# Patient Record
Sex: FEMALE | Race: WHITE | NOT HISPANIC OR LATINO | Employment: OTHER | URBAN - METROPOLITAN AREA
[De-identification: names, ages, dates, MRNs, and addresses within clinical notes are randomized per-mention and may not be internally consistent; named-entity substitution may affect disease eponyms.]

---

## 2024-01-12 PROBLEM — M79.641 RIGHT HAND PAIN: Status: ACTIVE | Noted: 2024-01-12

## 2024-01-15 ENCOUNTER — TELEPHONE (OUTPATIENT)
Dept: ENDOCRINOLOGY | Age: 69
End: 2024-01-15
Payer: MEDICARE

## 2024-01-15 ENCOUNTER — TELEPHONE (OUTPATIENT)
Dept: FAMILY MEDICINE CLINIC | Facility: CLINIC | Age: 69
End: 2024-01-15

## 2024-01-15 DIAGNOSIS — E87.5 HYPERKALEMIA: Primary | ICD-10-CM

## 2024-01-15 NOTE — TELEPHONE ENCOUNTER
Contacted [employer] rep to see if they could send us an order form for pt to get their supplies with them.

## 2024-01-15 NOTE — TELEPHONE ENCOUNTER
Provider: MEREDITH DAVID     Caller: NIKKIE GARCIA     Relationship to Patient: SELF    Phone Number:     [phone]       Reason for Call: THE PT WANTS TO CHANGE WHERE SHE GETS HER DEXCOM SUPPLIES TO [facility] THEIR PHONE NUMBER [phone].

## 2024-01-17 ENCOUNTER — TELEPHONE (OUTPATIENT)
Dept: ENDOCRINOLOGY | Age: 69
End: 2024-01-17
Payer: MEDICARE

## 2024-01-17 NOTE — TELEPHONE ENCOUNTER
Caller: Yulia Freedman    Relationship: Self    Best call back number: [phone]    Equipment requested: DIABETIC SUPPLIES     Reason for the request: PT NAME HAS CHANGED TO ODILONKO AND THAT IS WHAT IS ON INS CARD, DUE TO NAME MISMATCH ON RX AND INS CARD, [facility] WAS UNSURE WHAT TO DO WITH RX AND WILL NEED A NEW ONE WRITTEN.     Prescribing Provider: JOANN     Additional information or concerns: PT IS GETTING LOW, BUT IS OKAY AT THE MOMENT.

## 2024-01-24 ENCOUNTER — TELEPHONE (OUTPATIENT)
Dept: ENDOCRINOLOGY | Age: 69
End: 2024-01-24

## 2024-01-24 ENCOUNTER — TELEPHONE (OUTPATIENT)
Dept: ENDOCRINOLOGY | Age: 69
End: 2024-01-24
Payer: MEDICARE

## 2024-01-24 ENCOUNTER — TELEPHONE (OUTPATIENT)
Dept: FAMILY MEDICINE CLINIC | Facility: CLINIC | Age: 69
End: 2024-01-24
Payer: MEDICARE

## 2024-01-24 NOTE — TELEPHONE ENCOUNTER
Caller: Yulia Iniguez    Relationship: Self    Best call back number: [phone]     Requested Prescriptions:      NEW RX FOR DEXCOM G6 AND SUPPLIES    Pharmacy where request should be sent:  [facility]    Last office visit with prescribing clinician: 11/13/2023     Next office visit with prescribing clinician: 5/24/2024     Additional details provided by patient: PATIENT STATES [facility] CALLED HER STATING THEY HAD FAXED A REQUEST TO OUR OFFICE, AND WERE STILL WAITING TO RECEIVE ORDERS.      Does the patient have less than a 3 day supply:  [x] Yes  [] No    Would you like a call back once the refill request has been completed: [x] Yes [] No    If the office needs to give you a call back, can they leave a voicemail: [x] Yes [] No    Carmina Li Rep   01/24/24 15:09 EST

## 2024-01-24 NOTE — TELEPHONE ENCOUNTER
Patient called to ask about progress on her referral to hand surgery. Patient would like a call back at [phone].

## 2024-01-24 NOTE — TELEPHONE ENCOUNTER
Caller: NNEKA([facility])    Relationship: Other    Best call back number: [phone]    What form or medical record are you requesting: FORM REGARDING HER DEXCOM G6    Who is requesting this form or medical record from you: [facility]    How would you like to receive the form or medical records (pick-up, mail, fax):   If fax, what is the fax number: [phone]  Timeframe paperwork needed: AS SOON AS POSSIBLE    Additional notes: THEY NEED TO VERIFY THAT HER NAME IS LISTED AS KRISTY AND NOT JOSE

## 2024-01-26 DIAGNOSIS — E87.5 HYPERKALEMIA: ICD-10-CM

## 2024-01-26 NOTE — TELEPHONE ENCOUNTER
Caller: Yulia Iniguez    Relationship to patient: Self    Best call back number: [phone]    Patient is needing: PATIENT IS NEEDING G6 SUPPLIES SENT, AND THE G7 WAS CALLED IN AND NOT THE G6. PLEASE ADVICE, PATIENT IS VERY UPSET

## 2024-01-27 LAB
BUN SERPL-MCNC: 16 MG/DL (ref 8–23)
BUN/CREAT SERPL: 14.2 (ref 7–25)
CALCIUM SERPL-MCNC: 9.8 MG/DL (ref 8.6–10.5)
CHLORIDE SERPL-SCNC: 104 MMOL/L (ref 98–107)
CO2 SERPL-SCNC: 25.7 MMOL/L (ref 22–29)
CREAT SERPL-MCNC: 1.13 MG/DL (ref 0.57–1)
EGFRCR SERPLBLD CKD-EPI 2021: 53.1 ML/MIN/1.73
GLUCOSE SERPL-MCNC: 134 MG/DL (ref 65–99)
POTASSIUM SERPL-SCNC: 5.3 MMOL/L (ref 3.5–5.2)
SODIUM SERPL-SCNC: 140 MMOL/L (ref 136–145)

## 2024-01-29 DIAGNOSIS — I10 PRIMARY HYPERTENSION: Primary | ICD-10-CM

## 2024-01-31 ENCOUNTER — TELEPHONE (OUTPATIENT)
Dept: ENDOCRINOLOGY | Age: 69
End: 2024-01-31
Payer: MEDICARE

## 2024-01-31 NOTE — TELEPHONE ENCOUNTER
Pharmacy Name:  [facility]     Reference Number (if applicable): [id number]     Pharmacy representative name: HAILY COURTNEY     Pharmacy representative phone number: [phone]    What medication are you calling in regards to: DEXCOM G6 SENSORS     What question does the pharmacy have: NEEDS A PA THROUGH [employer] FOR [employer] D COVERAGE. PT IS DOWN TO LAST SENSOR. PLEASE CALL ASAP.     Who is the provider that prescribed the medication: MEREDITH DAVID     Additional notes: NA

## 2024-02-01 NOTE — TELEPHONE ENCOUNTER
Called and spoke with Adelaida from [facility] to let them know I already did a pa for her cgm supplies and it was approved, they confirmed that they saw that and will ship patient's order.

## 2024-02-12 ENCOUNTER — OFFICE VISIT (OUTPATIENT)
Dept: SURGERY | Facility: CLINIC | Age: 69
End: 2024-02-12
Payer: MEDICARE

## 2024-02-12 ENCOUNTER — TELEPHONE (OUTPATIENT)
Dept: ENDOCRINOLOGY | Age: 69
End: 2024-02-12
Payer: MEDICARE

## 2024-02-12 VITALS
DIASTOLIC BLOOD PRESSURE: 76 MMHG | BODY MASS INDEX: 29.02 KG/M2 | WEIGHT: 147.8 LBS | SYSTOLIC BLOOD PRESSURE: 132 MMHG | HEIGHT: 60 IN

## 2024-02-12 DIAGNOSIS — N60.92 ATYPICAL DUCTAL HYPERPLASIA OF LEFT BREAST: ICD-10-CM

## 2024-02-12 DIAGNOSIS — N64.89 RADIAL SCAR OF BREAST: ICD-10-CM

## 2024-02-12 DIAGNOSIS — Z12.31 ENCOUNTER FOR SCREENING MAMMOGRAM FOR MALIGNANT NEOPLASM OF BREAST: ICD-10-CM

## 2024-02-12 DIAGNOSIS — Z91.89 AT HIGH RISK FOR BREAST CANCER: Primary | ICD-10-CM

## 2024-02-12 PROCEDURE — 1160F RVW MEDS BY RX/DR IN RCRD: CPT

## 2024-02-12 PROCEDURE — 3078F DIAST BP <80 MM HG: CPT

## 2024-02-12 PROCEDURE — 3075F SYST BP GE 130 - 139MM HG: CPT

## 2024-02-12 PROCEDURE — 1159F MED LIST DOCD IN RCRD: CPT

## 2024-02-12 PROCEDURE — 99213 OFFICE O/P EST LOW 20 MIN: CPT

## 2024-03-15 ENCOUNTER — TELEPHONE (OUTPATIENT)
Dept: ENDOCRINOLOGY | Age: 69
End: 2024-03-15
Payer: MEDICARE

## 2024-03-15 ENCOUNTER — HOSPITAL ENCOUNTER (OUTPATIENT)
Dept: MAMMOGRAPHY | Facility: HOSPITAL | Age: 69
Discharge: HOME | End: 2024-03-15
Payer: MEDICARE

## 2024-03-15 DIAGNOSIS — Z91.89 AT HIGH RISK FOR BREAST CANCER: ICD-10-CM

## 2024-03-15 DIAGNOSIS — E10.65 TYPE 1 DIABETES MELLITUS WITH HYPERGLYCEMIA: ICD-10-CM

## 2024-03-15 DIAGNOSIS — N60.92 ATYPICAL DUCTAL HYPERPLASIA OF LEFT BREAST: ICD-10-CM

## 2024-03-15 DIAGNOSIS — N64.89 RADIAL SCAR OF BREAST: ICD-10-CM

## 2024-03-15 DIAGNOSIS — Z12.31 ENCOUNTER FOR SCREENING MAMMOGRAM FOR MALIGNANT NEOPLASM OF BREAST: ICD-10-CM

## 2024-03-15 PROCEDURE — 77067 SCR MAMMO BI INCL CAD: CPT

## 2024-03-15 PROCEDURE — 77063 BREAST TOMOSYNTHESIS BI: CPT

## 2024-03-15 RX ORDER — INSULIN GLARGINE 100 [IU]/ML
20 INJECTION, SOLUTION SUBCUTANEOUS DAILY
Qty: 18 ML | Refills: 0 | Status: SHIPPED | OUTPATIENT
Start: 2024-03-15

## 2024-03-15 NOTE — TELEPHONE ENCOUNTER
PT CAME IN FOR A REFILL OF THE BELOW MED, SHE STATED THAT IT NEEDS TO BE IN THE VIALS NOT THE PENS.      insulin glargine (Lantus) 100 UNIT/ML injection [43535]         GOING TO:  [facility]. - [address] [phone] [facility] [phone] FX

## 2024-03-19 ENCOUNTER — TELEPHONE (OUTPATIENT)
Dept: SURGERY | Facility: CLINIC | Age: 69
End: 2024-03-19
Payer: MEDICARE

## 2024-03-19 DIAGNOSIS — N60.92 ATYPICAL DUCTAL HYPERPLASIA OF LEFT BREAST: ICD-10-CM

## 2024-03-19 DIAGNOSIS — N64.89 RADIAL SCAR OF BREAST: ICD-10-CM

## 2024-03-19 DIAGNOSIS — Z91.89 AT HIGH RISK FOR BREAST CANCER: Primary | ICD-10-CM

## 2024-03-19 NOTE — TELEPHONE ENCOUNTER
----- Message from Narcisa Marsh PA-C sent at 3/19/2024  1:24 PM EDT -----  Regarding: results  Hi,    Can you please call her and let her know that her recent mammogram was benign?  Her MRI will be due 09/2024. I have placed an order. She should keep her one year follow-up appointment with me next year.  If she needs anything sooner, please let me know.    ---  3/15/2024 Bilateral Screening Mammogram:  IMPRESSION:  1. There is no evidence for malignancy or significant change in either breast. Routine followup mammography is recommended.   BI-RADS category 2: Benign.    Thanks,  Narcisa

## 2024-04-02 DIAGNOSIS — E78.2 MIXED HYPERLIPIDEMIA: ICD-10-CM

## 2024-04-02 DIAGNOSIS — I10 PRIMARY HYPERTENSION: ICD-10-CM

## 2024-04-02 DIAGNOSIS — M81.0 OSTEOPOROSIS WITHOUT CURRENT PATHOLOGICAL FRACTURE, UNSPECIFIED OSTEOPOROSIS TYPE: ICD-10-CM

## 2024-04-02 RX ORDER — ALENDRONATE SODIUM 70 MG/1
TABLET ORAL
Qty: 12 TABLET | Refills: 3 | Status: SHIPPED | OUTPATIENT
Start: 2024-04-02

## 2024-04-02 RX ORDER — ATORVASTATIN CALCIUM 10 MG/1
10 TABLET, FILM COATED ORAL DAILY
Qty: 90 TABLET | Refills: 3 | Status: SHIPPED | OUTPATIENT
Start: 2024-04-02

## 2024-04-02 RX ORDER — HYDROCHLOROTHIAZIDE 25 MG/1
25 TABLET ORAL DAILY
Qty: 90 TABLET | Refills: 3 | Status: SHIPPED | OUTPATIENT
Start: 2024-04-02

## 2024-04-02 RX ORDER — LISINOPRIL 40 MG/1
40 TABLET ORAL DAILY
Qty: 90 TABLET | Refills: 3 | Status: SHIPPED | OUTPATIENT
Start: 2024-04-02

## 2024-04-02 NOTE — TELEPHONE ENCOUNTER
Caller: Yulia Iniguez    Relationship: Self    Best call back number: [phone]    Requested Prescriptions:   Requested Prescriptions     Pending Prescriptions Disp Refills    lisinopril (PRINIVIL,ZESTRIL) 40 MG tablet 90 tablet 3     Sig: Take 1 tablet by mouth Daily.    atorvastatin (LIPITOR) 10 MG tablet 90 tablet 3     Sig: Take 1 tablet by mouth Daily.    hydroCHLOROthiazide 25 MG tablet 90 tablet 3     Sig: Take 1 tablet by mouth Daily.    alendronate (FOSAMAX) 70 MG tablet 12 tablet 3     Sig: TAKE 1 TABLET EVERY 7 DAYS WITH LARGE GLASS OF WATER 30 MIN BEFORE FIRST FOOD, DRINK, MEDS; AVOID LYING DOWN FOR 30 MIN        Pharmacy where request should be sent: [facility], [address] [phone] [facility] [phone]      Last office visit with prescribing clinician: 1/12/2024   Last telemedicine visit with prescribing clinician: Visit date not found   Next office visit with prescribing clinician: 7/26/2024     Additional details provided by patient: PATIENT STATES SHE HAS A WEEKS WORTH ON THESE MEDICATIONS.     Does the patient have less than a 3 day supply:  [] Yes  [] No    Would you like a call back once the refill request has been completed: [x] Yes [] No    If the office needs to give you a call back, can they leave a voicemail: [x] Yes [] No    Delmy Salinas, GIRMA   04/02/24 08:57 EDT

## 2024-04-02 NOTE — TELEPHONE ENCOUNTER
Last office visit with prescribing clinician: 1/12/2024   Last telemedicine visit with prescribing clinician: Visit date not found   Next office visit with prescribing clinician: 7/26/2024

## 2024-04-03 ENCOUNTER — TELEPHONE (OUTPATIENT)
Dept: ENDOCRINOLOGY | Age: 69
End: 2024-04-03

## 2024-04-03 NOTE — TELEPHONE ENCOUNTER
"Can you tell me what they are referring to? Is there a different brand- if you want to contact the patient, I am happy to send her RX to you all since her current pharmacy is not helping her and I can't just call the pharmacy and say \" level 2\"- so I'm not even sure what they mean   "

## 2024-04-12 ENCOUNTER — TRANSCRIBE ORDERS (OUTPATIENT)
Dept: ADMINISTRATIVE | Facility: HOSPITAL | Age: 69
End: 2024-04-12
Payer: MEDICARE

## 2024-04-12 DIAGNOSIS — Z87.891 PERSONAL HISTORY OF TOBACCO USE, PRESENTING HAZARDS TO HEALTH: Primary | ICD-10-CM

## 2024-04-18 ENCOUNTER — TELEPHONE (OUTPATIENT)
Dept: ENDOCRINOLOGY | Age: 69
End: 2024-04-18
Payer: MEDICARE

## 2024-04-18 ENCOUNTER — TELEPHONE (OUTPATIENT)
Dept: FAMILY MEDICINE CLINIC | Facility: CLINIC | Age: 69
End: 2024-04-18

## 2024-04-18 NOTE — TELEPHONE ENCOUNTER
Incoming Refill Request      Medication requested (name and dose): HUMALOG VIAL    Pharmacy where request should be sent: [facility]     Additional details provided by patient: INSURANCE DOES NOT COVER NOVOLOG    Best call back number:     Does the patient have less than a 3 day supply:  [] Yes  [] No    Carmina De Dios Rep  04/18/24, 09:34 EDT

## 2024-04-18 NOTE — TELEPHONE ENCOUNTER
Caller: Yulia Iniguez    Relationship: Self    Best call back number: [phone]     What form or medical record are you requesting: LETTER STATING PATIENT NEEDS HER DOG WITH HER DUE TO HER ANXIETY. HAS TO BE ON A LETTER HEAD.    Who is requesting this form or medical record from you: NEW APARTMENT     How would you like to receive the form or medical records (pick-up, mail, fax):     Timeframe paperwork needed: ASAP, PATIENT NEEDS TO  LETTER TOMORROW.     Additional notes: PATIENT IS MOVING INTO A NEW APARTMENT AND NEEDS THIS LETTER ASAP. PLEASE CALL PATIENT ONCE LETTER IS AVAILABLE FOR .

## 2024-04-19 RX ORDER — INSULIN LISPRO 100 [IU]/ML
INJECTION, SOLUTION INTRAVENOUS; SUBCUTANEOUS
Qty: 50 ML | Refills: 0 | Status: SHIPPED | OUTPATIENT
Start: 2024-04-19

## 2024-05-06 ENCOUNTER — HOSPITAL ENCOUNTER (OUTPATIENT)
Facility: HOSPITAL | Age: 69
Setting detail: OBSERVATION
LOS: 1 days | Discharge: HOME HEALTH | End: 2024-05-08
Attending: STUDENT IN AN ORGANIZED HEALTH CARE EDUCATION/TRAINING PROGRAM | Admitting: INTERNAL MEDICINE
Payer: MEDICARE

## 2024-05-06 ENCOUNTER — APPOINTMENT (OUTPATIENT)
Dept: GENERAL RADIOLOGY | Facility: HOSPITAL | Age: 69
End: 2024-05-06
Payer: MEDICARE

## 2024-05-06 DIAGNOSIS — E10.65 TYPE 1 DIABETES MELLITUS WITH HYPERGLYCEMIA: ICD-10-CM

## 2024-05-06 DIAGNOSIS — S92.404A NONDISPLACED UNSPECIFIED FRACTURE OF RIGHT GREAT TOE, INITIAL ENCOUNTER FOR CLOSED FRACTURE: ICD-10-CM

## 2024-05-06 DIAGNOSIS — S82.034A CLOSED NONDISPLACED TRANSVERSE FRACTURE OF RIGHT PATELLA, INITIAL ENCOUNTER: Primary | ICD-10-CM

## 2024-05-06 DIAGNOSIS — S82.024A CLOSED NONDISPLACED LONGITUDINAL FRACTURE OF RIGHT PATELLA, INITIAL ENCOUNTER: ICD-10-CM

## 2024-05-06 PROBLEM — S82.001A RIGHT PATELLA FRACTURE: Status: ACTIVE | Noted: 2024-05-06

## 2024-05-06 LAB
ALBUMIN SERPL-MCNC: 3.6 G/DL (ref 3.5–5.2)
ALBUMIN/GLOB SERPL: 1.7 G/DL
ALP SERPL-CCNC: 43 U/L (ref 39–117)
ALT SERPL W P-5'-P-CCNC: 10 U/L (ref 1–33)
ANION GAP SERPL CALCULATED.3IONS-SCNC: 11.5 MMOL/L (ref 5–15)
AST SERPL-CCNC: 22 U/L (ref 1–32)
BASOPHILS # BLD AUTO: 0.03 10*3/MM3 (ref 0–0.2)
BASOPHILS NFR BLD AUTO: 0.3 % (ref 0–1.5)
BILIRUB SERPL-MCNC: 0.5 MG/DL (ref 0–1.2)
BUN SERPL-MCNC: 17 MG/DL (ref 8–23)
BUN/CREAT SERPL: 18.9 (ref 7–25)
CALCIUM SPEC-SCNC: 8.7 MG/DL (ref 8.6–10.5)
CHLORIDE SERPL-SCNC: 104 MMOL/L (ref 98–107)
CO2 SERPL-SCNC: 21.5 MMOL/L (ref 22–29)
CREAT SERPL-MCNC: 0.9 MG/DL (ref 0.57–1)
DEPRECATED RDW RBC AUTO: 40.6 FL (ref 37–54)
EGFRCR SERPLBLD CKD-EPI 2021: 69.8 ML/MIN/1.73
EOSINOPHIL # BLD AUTO: 0.03 10*3/MM3 (ref 0–0.4)
EOSINOPHIL NFR BLD AUTO: 0.3 % (ref 0.3–6.2)
ERYTHROCYTE [DISTWIDTH] IN BLOOD BY AUTOMATED COUNT: 11.8 % (ref 12.3–15.4)
GLOBULIN UR ELPH-MCNC: 2.1 GM/DL
GLUCOSE BLDC GLUCOMTR-MCNC: 124 MG/DL (ref 70–130)
GLUCOSE SERPL-MCNC: 204 MG/DL (ref 65–99)
HCT VFR BLD AUTO: 32.8 % (ref 34–46.6)
HGB BLD-MCNC: 10.6 G/DL (ref 12–15.9)
IMM GRANULOCYTES # BLD AUTO: 0.03 10*3/MM3 (ref 0–0.05)
IMM GRANULOCYTES NFR BLD AUTO: 0.3 % (ref 0–0.5)
LYMPHOCYTES # BLD AUTO: 1.98 10*3/MM3 (ref 0.7–3.1)
LYMPHOCYTES NFR BLD AUTO: 22.4 % (ref 19.6–45.3)
MCH RBC QN AUTO: 30.2 PG (ref 26.6–33)
MCHC RBC AUTO-ENTMCNC: 32.3 G/DL (ref 31.5–35.7)
MCV RBC AUTO: 93.4 FL (ref 79–97)
MONOCYTES # BLD AUTO: 0.85 10*3/MM3 (ref 0.1–0.9)
MONOCYTES NFR BLD AUTO: 9.6 % (ref 5–12)
NEUTROPHILS NFR BLD AUTO: 5.92 10*3/MM3 (ref 1.7–7)
NEUTROPHILS NFR BLD AUTO: 67.1 % (ref 42.7–76)
NRBC BLD AUTO-RTO: 0 /100 WBC (ref 0–0.2)
PLATELET # BLD AUTO: 257 10*3/MM3 (ref 140–450)
PMV BLD AUTO: 10.5 FL (ref 6–12)
POTASSIUM SERPL-SCNC: 4.4 MMOL/L (ref 3.5–5.2)
PROT SERPL-MCNC: 5.7 G/DL (ref 6–8.5)
RBC # BLD AUTO: 3.51 10*6/MM3 (ref 3.77–5.28)
SODIUM SERPL-SCNC: 137 MMOL/L (ref 136–145)
WBC NRBC COR # BLD AUTO: 8.84 10*3/MM3 (ref 3.4–10.8)

## 2024-05-06 PROCEDURE — 96374 THER/PROPH/DIAG INJ IV PUSH: CPT

## 2024-05-06 PROCEDURE — 99285 EMERGENCY DEPT VISIT HI MDM: CPT

## 2024-05-06 PROCEDURE — 25010000002 MORPHINE PER 10 MG: Performed by: STUDENT IN AN ORGANIZED HEALTH CARE EDUCATION/TRAINING PROGRAM

## 2024-05-06 PROCEDURE — 85025 COMPLETE CBC W/AUTO DIFF WBC: CPT | Performed by: STUDENT IN AN ORGANIZED HEALTH CARE EDUCATION/TRAINING PROGRAM

## 2024-05-06 PROCEDURE — 73630 X-RAY EXAM OF FOOT: CPT

## 2024-05-06 PROCEDURE — 82948 REAGENT STRIP/BLOOD GLUCOSE: CPT

## 2024-05-06 PROCEDURE — 80053 COMPREHEN METABOLIC PANEL: CPT | Performed by: STUDENT IN AN ORGANIZED HEALTH CARE EDUCATION/TRAINING PROGRAM

## 2024-05-06 PROCEDURE — 73502 X-RAY EXAM HIP UNI 2-3 VIEWS: CPT

## 2024-05-06 PROCEDURE — 73560 X-RAY EXAM OF KNEE 1 OR 2: CPT

## 2024-05-06 PROCEDURE — 25010000002 TETANUS-DIPHTH-ACELL PERTUSSIS 5-2.5-18.5 LF-MCG/0.5 SUSPENSION PREFILLED SYRINGE: Performed by: STUDENT IN AN ORGANIZED HEALTH CARE EDUCATION/TRAINING PROGRAM

## 2024-05-06 PROCEDURE — 90715 TDAP VACCINE 7 YRS/> IM: CPT | Performed by: STUDENT IN AN ORGANIZED HEALTH CARE EDUCATION/TRAINING PROGRAM

## 2024-05-06 PROCEDURE — 90471 IMMUNIZATION ADMIN: CPT | Performed by: STUDENT IN AN ORGANIZED HEALTH CARE EDUCATION/TRAINING PROGRAM

## 2024-05-06 RX ORDER — AMOXICILLIN 250 MG
2 CAPSULE ORAL 2 TIMES DAILY PRN
Status: DISCONTINUED | OUTPATIENT
Start: 2024-05-06 | End: 2024-05-08 | Stop reason: HOSPADM

## 2024-05-06 RX ORDER — INSULIN LISPRO 100 [IU]/ML
2-9 INJECTION, SOLUTION INTRAVENOUS; SUBCUTANEOUS
Status: DISCONTINUED | OUTPATIENT
Start: 2024-05-06 | End: 2024-05-08 | Stop reason: HOSPADM

## 2024-05-06 RX ORDER — INSULIN GLARGINE 100 [IU]/ML
20 INJECTION, SOLUTION SUBCUTANEOUS DAILY
Status: DISCONTINUED | OUTPATIENT
Start: 2024-05-07 | End: 2024-05-07

## 2024-05-06 RX ORDER — HYDROCODONE BITARTRATE AND ACETAMINOPHEN 5; 325 MG/1; MG/1
1 TABLET ORAL ONCE
Status: COMPLETED | OUTPATIENT
Start: 2024-05-06 | End: 2024-05-06

## 2024-05-06 RX ORDER — LISINOPRIL 40 MG/1
40 TABLET ORAL DAILY
Status: DISCONTINUED | OUTPATIENT
Start: 2024-05-07 | End: 2024-05-08 | Stop reason: HOSPADM

## 2024-05-06 RX ORDER — ACETAMINOPHEN 325 MG/1
650 TABLET ORAL EVERY 4 HOURS PRN
Status: DISCONTINUED | OUTPATIENT
Start: 2024-05-06 | End: 2024-05-08 | Stop reason: HOSPADM

## 2024-05-06 RX ORDER — ATORVASTATIN CALCIUM 20 MG/1
10 TABLET, FILM COATED ORAL DAILY
Status: DISCONTINUED | OUTPATIENT
Start: 2024-05-07 | End: 2024-05-08 | Stop reason: HOSPADM

## 2024-05-06 RX ORDER — ASPIRIN 81 MG/1
81 TABLET ORAL DAILY
Status: DISCONTINUED | OUTPATIENT
Start: 2024-05-07 | End: 2024-05-08 | Stop reason: HOSPADM

## 2024-05-06 RX ORDER — MELATONIN
5000 DAILY
Status: DISCONTINUED | OUTPATIENT
Start: 2024-05-07 | End: 2024-05-08 | Stop reason: HOSPADM

## 2024-05-06 RX ORDER — BISACODYL 10 MG
10 SUPPOSITORY, RECTAL RECTAL DAILY PRN
Status: DISCONTINUED | OUTPATIENT
Start: 2024-05-06 | End: 2024-05-08 | Stop reason: HOSPADM

## 2024-05-06 RX ORDER — ACETAMINOPHEN 160 MG/5ML
650 SOLUTION ORAL EVERY 4 HOURS PRN
Status: DISCONTINUED | OUTPATIENT
Start: 2024-05-06 | End: 2024-05-08 | Stop reason: HOSPADM

## 2024-05-06 RX ORDER — NICOTINE POLACRILEX 4 MG
15 LOZENGE BUCCAL
Status: DISCONTINUED | OUTPATIENT
Start: 2024-05-06 | End: 2024-05-08 | Stop reason: HOSPADM

## 2024-05-06 RX ORDER — HYDROCHLOROTHIAZIDE 25 MG/1
25 TABLET ORAL DAILY
Status: DISCONTINUED | OUTPATIENT
Start: 2024-05-07 | End: 2024-05-08 | Stop reason: HOSPADM

## 2024-05-06 RX ORDER — LANOLIN ALCOHOL/MO/W.PET/CERES
50 CREAM (GRAM) TOPICAL DAILY
Status: DISCONTINUED | OUTPATIENT
Start: 2024-05-07 | End: 2024-05-08 | Stop reason: HOSPADM

## 2024-05-06 RX ORDER — MULTIPLE VITAMINS W/ MINERALS TAB 9MG-400MCG
1 TAB ORAL DAILY
Status: DISCONTINUED | OUTPATIENT
Start: 2024-05-07 | End: 2024-05-08 | Stop reason: HOSPADM

## 2024-05-06 RX ORDER — ONDANSETRON 2 MG/ML
4 INJECTION INTRAMUSCULAR; INTRAVENOUS EVERY 6 HOURS PRN
Status: DISCONTINUED | OUTPATIENT
Start: 2024-05-06 | End: 2024-05-08 | Stop reason: HOSPADM

## 2024-05-06 RX ORDER — DEXTROSE MONOHYDRATE 25 G/50ML
25 INJECTION, SOLUTION INTRAVENOUS
Status: DISCONTINUED | OUTPATIENT
Start: 2024-05-06 | End: 2024-05-08 | Stop reason: HOSPADM

## 2024-05-06 RX ORDER — SODIUM CHLORIDE 0.9 % (FLUSH) 0.9 %
10 SYRINGE (ML) INJECTION AS NEEDED
Status: DISCONTINUED | OUTPATIENT
Start: 2024-05-06 | End: 2024-05-08 | Stop reason: HOSPADM

## 2024-05-06 RX ORDER — POLYETHYLENE GLYCOL 3350 17 G/17G
17 POWDER, FOR SOLUTION ORAL DAILY PRN
Status: DISCONTINUED | OUTPATIENT
Start: 2024-05-06 | End: 2024-05-08 | Stop reason: HOSPADM

## 2024-05-06 RX ORDER — INSULIN LISPRO 100 [IU]/ML
18 INJECTION, SOLUTION INTRAVENOUS; SUBCUTANEOUS
Status: DISCONTINUED | OUTPATIENT
Start: 2024-05-07 | End: 2024-05-07

## 2024-05-06 RX ORDER — SODIUM CHLORIDE 0.9 % (FLUSH) 0.9 %
10 SYRINGE (ML) INJECTION EVERY 12 HOURS SCHEDULED
Status: DISCONTINUED | OUTPATIENT
Start: 2024-05-06 | End: 2024-05-08 | Stop reason: HOSPADM

## 2024-05-06 RX ORDER — SODIUM CHLORIDE 9 MG/ML
40 INJECTION, SOLUTION INTRAVENOUS AS NEEDED
Status: DISCONTINUED | OUTPATIENT
Start: 2024-05-06 | End: 2024-05-08 | Stop reason: HOSPADM

## 2024-05-06 RX ORDER — ALBUTEROL SULFATE 2.5 MG/3ML
2.5 SOLUTION RESPIRATORY (INHALATION) EVERY 6 HOURS PRN
Status: DISCONTINUED | OUTPATIENT
Start: 2024-05-06 | End: 2024-05-08 | Stop reason: HOSPADM

## 2024-05-06 RX ORDER — MORPHINE SULFATE 2 MG/ML
4 INJECTION, SOLUTION INTRAMUSCULAR; INTRAVENOUS ONCE
Status: COMPLETED | OUTPATIENT
Start: 2024-05-06 | End: 2024-05-06

## 2024-05-06 RX ORDER — BISACODYL 5 MG/1
5 TABLET, DELAYED RELEASE ORAL DAILY PRN
Status: DISCONTINUED | OUTPATIENT
Start: 2024-05-06 | End: 2024-05-08 | Stop reason: HOSPADM

## 2024-05-06 RX ORDER — HYDROCODONE BITARTRATE AND ACETAMINOPHEN 5; 325 MG/1; MG/1
1 TABLET ORAL EVERY 4 HOURS PRN
Status: DISCONTINUED | OUTPATIENT
Start: 2024-05-06 | End: 2024-05-08 | Stop reason: HOSPADM

## 2024-05-06 RX ORDER — IBUPROFEN 600 MG/1
1 TABLET ORAL
Status: DISCONTINUED | OUTPATIENT
Start: 2024-05-06 | End: 2024-05-08 | Stop reason: HOSPADM

## 2024-05-06 RX ORDER — ACETAMINOPHEN 650 MG/1
650 SUPPOSITORY RECTAL EVERY 4 HOURS PRN
Status: DISCONTINUED | OUTPATIENT
Start: 2024-05-06 | End: 2024-05-08 | Stop reason: HOSPADM

## 2024-05-06 RX ORDER — UBIDECARENONE 75 MG
CAPSULE ORAL DAILY
COMMUNITY

## 2024-05-06 RX ADMIN — Medication 10 ML: at 22:33

## 2024-05-06 RX ADMIN — MORPHINE SULFATE 4 MG: 2 INJECTION, SOLUTION INTRAMUSCULAR; INTRAVENOUS at 15:16

## 2024-05-06 RX ADMIN — HYDROCODONE BITARTRATE AND ACETAMINOPHEN 1 TABLET: 5; 325 TABLET ORAL at 22:29

## 2024-05-06 RX ADMIN — TETANUS TOXOID, REDUCED DIPHTHERIA TOXOID AND ACELLULAR PERTUSSIS VACCINE, ADSORBED 0.5 ML: 5; 2.5; 8; 8; 2.5 SUSPENSION INTRAMUSCULAR at 10:44

## 2024-05-06 RX ADMIN — HYDROCODONE BITARTRATE AND ACETAMINOPHEN 1 TABLET: 5; 325 TABLET ORAL at 10:44

## 2024-05-06 NOTE — H&P
Internal medicine history and physical  INTERNAL MEDICINE   [facility]       Patient Identification:  Name: Yulia Iniguez  Age: 68 y.o.  Sex: female  :  1955  MRN: [id number]                   Primary Care Physician: Nelli Grijalva APRN                               Date of admission:2024    Chief Complaint: Pain and discomfort in her right leg knee and foot after the fall early this morning.    History of Present Illness:   Patient is a 68-year-old female who has type 1 diabetes and understands that it is very brittle and has had episodes based on her Dexcom reading blood sugar going as low as 30s and very high and last episode of low blood sugar was few weeks ago but she did not do much about it and continue her current regimen decided to let her dog out 2:00 in the morning.  She thinks that she may have the wrong position because she could be under the effect of low blood sugar episode.  As she was coming back in she stubbed her right foot on the ground and landed on her right knee and in order To fall develop abrasions on the right and left elbow.  She eventually got up and went inside but this morning her pain was worse resulting in her coming to the emergency room for further evaluation where workup revealed abrasions involving her upper extremities and legs along with fracture of the right patella and hairline fracture of the distal phalanx of the right great toe.  Patient was given knee immobilizer after discussion with orthopedic surgery service and she was attempted to ambulate which she could not because of the pain.  Because of her inability to ambulate and underlying type 1 diabetes it was requested that patient would benefit from hospitalization.  Patient is being admitted for further care.  Blood sugar in the emergency room is 204.      Past Medical History:  Past Medical History:   Diagnosis Date    Anemia     Anxiety     CRI (chronic renal insufficiency), stage 2  (mild)     PATIENT DENIES    Essential hypertension, benign     Former smoker     History of substance abuse     DOWNERS AGE 27    Hyperlipidemia     Osteopenia     actual problem not clear and no outside DXA info    Osteoporosis without current pathological fracture     Shortness of breath     PATIENT STATES DUE TO ANXIETY    Type 2 diabetes mellitus      Past Surgical History:  Past Surgical History:   Procedure Laterality Date    BREAST BIOPSY  left    BREAST CYST ASPIRATION      BREAST LUMPECTOMY Left 2023    Procedure: left breast wire bracketed excisional biopsy;  Surgeon: Alanna Hamm MD;  Location: [facility] OR [facility];  Service: General;  Laterality: Left;     SECTION      x2    COLONOSCOPY      normal    HAND SURGERY      trigger finger    HERNIA REPAIR      left side of abdomen    LAPAROSCOPIC TUBAL LIGATION      X2    TONSILLECTOMY      US GUIDED CYST ASPIRATION BREAST N/A 2022      Home Meds:  (Not in a hospital admission)    Current Meds:     Current Facility-Administered Medications:     morphine injection 4 mg, 4 mg, Intravenous, Once, Don Cervantes MD    Current Outpatient Medications:     Accu-Chek Softclix Lancets lancets, Check 5 times a day on insulin tx, poor control, hypoglycemia. Dx: E 10.65, Disp: 500 each, Rfl: 4    albuterol sulfate  (90 Base) MCG/ACT inhaler, Inhale 2 puffs Every 6 (Six) Hours As Needed for Shortness of Air., Disp: 18 g, Rfl: 0    alendronate (FOSAMAX) 70 MG tablet, TAKE 1 TABLET EVERY 7 DAYS WITH LARGE GLASS OF WATER 30 MIN BEFORE FIRST FOOD, DRINK, MEDS; AVOID LYING DOWN FOR 30 MIN, Disp: 12 tablet, Rfl: 3    aspirin 81 MG EC tablet, Take 1 tablet by mouth Daily., Disp: , Rfl:     atorvastatin (LIPITOR) 10 MG tablet, Take 1 tablet by mouth Daily., Disp: 90 tablet, Rfl: 3    Blood Glucose Monitoring Suppl (Accu-Chek Guide) w/Device kit, , Disp: , Rfl:     Continuous Blood Gluc Sensor (Dexcom G6 Sensor), Dipsense Dexcom G6 Sensors, to replace  "every 10 days, 3 sensors per 30 day period (NDC #[address]-44). Check 6 times a day. Dx: E 10.65, Disp: 9 each, Rfl: 4    Continuous Blood Gluc Transmit (Dexcom G6 Transmitter) misc, 1 each Every 3 (Three) Months. Dispense 1 Dexcom G6 Transmitter for each 3 month period (NDC #[address]-36). Check 6 times a day. Dx: E 10.65, Disp: 1 each, Rfl: 4    Glucagon (Gvoke HypoPen 2-Pack) 1 MG/0.2ML solution auto-injector, Inject 1 mg under the skin into the appropriate area as directed As Needed (hypoglycemia, call 911 and administer)., Disp: 0.4 mL, Rfl: 1    GLUCOSAMINE CHONDROITIN COMPLX PO, Take 1 tablet by mouth Daily., Disp: , Rfl:     glucose blood (Accu-Chek Guide) test strip, Check 5 times a day on insulin tx, poor control, hypoglycemia. Dx: E 10.65, Disp: 500 each, Rfl: 4    HumaLOG 100 UNIT/ML injection, up to 50 units a day (Patient taking differently: Inject 18 Units under the skin into the appropriate area as directed 3 (Three) Times a Day Before Meals. up to 50 units a day), Disp: 50 mL, Rfl: 0    hydroCHLOROthiazide 25 MG tablet, Take 1 tablet by mouth Daily., Disp: 90 tablet, Rfl: 3    Insulin Aspart (novoLOG) 100 UNIT/ML injection, Use as directed for meal & correction coverage up to 35 units a day, Disp: 31.5 mL, Rfl: 1    insulin glargine (Lantus) 100 UNIT/ML injection, Inject 20 Units under the skin into the appropriate area as directed Daily., Disp: 18 mL, Rfl: 0    Insulin Syringe-Needle U-100 (BD Veo Insulin Syringe U/F) 31G X 15/64\" 0.3 ML misc, For use to give insulin from vial up to 5 times a day. Can substitute based on availability and insurance., Disp: 500 each, Rfl: 4    lisinopril (PRINIVIL,ZESTRIL) 40 MG tablet, Take 1 tablet by mouth Daily., Disp: 90 tablet, Rfl: 3    multivitamin with minerals tablet tablet, Take 1 tablet by mouth Daily. HOLD ONE WEEK FOR SURGERY, Disp: , Rfl:     Pyridoxine HCl (VITAMIN B6 PO), Take 1 tablet by mouth Daily., Disp: , Rfl:     VITAMIN D, CHOLECALCIFEROL, " "PO, Take 1 tablet by mouth Daily., Disp: , Rfl:   Allergies:  Allergies   Allergen Reactions    Buspirone Other (See Comments)     Carol Woodward     Social History:   Social History     Tobacco Use    Smoking status: Former     Current packs/day: 0.00     Types: Cigarettes     Start date: 1970     Quit date: 10/3/2012     Years since quittin.5    Smokeless tobacco: Never    Tobacco comments:     previously smoked about 2 packs per day for about  35 years; did not smoke during pregnancies     Caffeine 2 Cups/day    Substance Use Topics    Alcohol use: Yes     Comment: social      Family History:  Family History   Problem Relation Age of Onset    Thyroid disease Mother         weight gain thyroid    No Known Problems Father     Thyroid disease Sister         graves disease    ADD / ADHD Son     Malig Hyperthermia Neg Hx           Review of Systems  See history of present illness and past medical history.  As described      Vitals:   /71   Pulse 59   Temp 98.3 °F (36.8 °C) (Tympanic)   Resp 16   Ht 152.4 cm (60\")   Wt 73.2 kg (161 lb 4.8 oz)   LMP  (LMP Unknown)   SpO2 99%   BMI 31.50 kg/m²   I/O: No intake or output data in the 24 hours ending 24 1603  Exam:  Patient is examined using the personal protective equipment as per guidelines from infection control for this particular patient as enacted.  Hand washing was performed before and after patient interaction.  General Appearance:    Alert, cooperative, no distress, appears stated age   Head:    Normocephalic, without obvious abnormality, atraumatic   Eyes:    PERRL, conjunctiva/corneas clear, EOM's intact, both eyes   Ears:    Normal external ear canals, both ears   Nose:   Nares normal, septum midline, mucosa normal, no drainage    or sinus tenderness   Throat:   Lips, tongue, gums normal; oral mucosa pink and moist   Neck: No adenopathy noted   Back:     Symmetric, no curvature, ROM normal, no CVA tenderness   Lungs:     Clear to " auscultation bilaterally, respirations unlabored   Chest Wall:    No tenderness or deformity    Heart:  S1-S2 regular   Abdomen:   Soft nontender   Extremities: Right leg is in an immobilizer and right great toe is ecchymotic and tender.   Pulses:   Pulses palpable in all extremities; symmetric all extremities   Skin: Multiple abrasions involving her elbows noted.   Neurologic: Alert and oriented and grossly nonfocal       Data Review:      I reviewed the patient's new clinical results.  Results from last 7 days   Lab Units 05/06/24  1407   WBC 10*3/mm3 8.84   HEMOGLOBIN g/dL 10.6*   PLATELETS 10*3/mm3 257     Results from last 7 days   Lab Units 05/06/24  1407   SODIUM mmol/L 137   POTASSIUM mmol/L 4.4   CHLORIDE mmol/L 104   CO2 mmol/L 21.5*   BUN mg/dL 17   CREATININE mg/dL 0.90   CALCIUM mg/dL 8.7   GLUCOSE mg/dL 204*     XR Knee 1 or 2 View Right    Result Date: 5/6/2024  1. Subtle lucency coursing across the medial base of the distal phalanx of the right great toe could represent a nondisplaced fracture. Please correlate with the clinical findings.  RIGHT KNEE 2 VIEWS  FINDINGS: There is a mildly displaced fracture of the inferior aspect of the right patella best seen on the lateral view. Mild tibiofemoral joint space narrowing is seen. Small suprapatellar effusion is seen.  IMPRESSION: 1. Right patella fracture.  AP PELVIS AND RIGHT HIP 2 VIEWS  FINDINGS: No acute bone, joint or soft tissue abnormalities are seen. Pelvic calcifications likely phleboliths are seen.  IMPRESSION: 1. No acute process identified in the pelvis and right hip.   This report was finalized on 5/6/2024 4:12 PM by Dr. Franc Cisse M.D on Workstation: [id number]      XR Foot 3+ View Bilateral    Result Date: 5/6/2024  1. Subtle lucency coursing across the medial base of the distal phalanx of the right great toe could represent a nondisplaced fracture. Please correlate with the clinical findings.  RIGHT KNEE 2 VIEWS  FINDINGS:  There is a mildly displaced fracture of the inferior aspect of the right patella best seen on the lateral view. Mild tibiofemoral joint space narrowing is seen. Small suprapatellar effusion is seen.  IMPRESSION: 1. Right patella fracture.  AP PELVIS AND RIGHT HIP 2 VIEWS  FINDINGS: No acute bone, joint or soft tissue abnormalities are seen. Pelvic calcifications likely phleboliths are seen.  IMPRESSION: 1. No acute process identified in the pelvis and right hip.   This report was finalized on 5/6/2024 4:12 PM by Dr. Franc Cisse M.D on Workstation: [id number]      XR Hip With or Without Pelvis 2 - 3 View Right    Result Date: 5/6/2024  1. Subtle lucency coursing across the medial base of the distal phalanx of the right great toe could represent a nondisplaced fracture. Please correlate with the clinical findings.  RIGHT KNEE 2 VIEWS  FINDINGS: There is a mildly displaced fracture of the inferior aspect of the right patella best seen on the lateral view. Mild tibiofemoral joint space narrowing is seen. Small suprapatellar effusion is seen.  IMPRESSION: 1. Right patella fracture.  AP PELVIS AND RIGHT HIP 2 VIEWS  FINDINGS: No acute bone, joint or soft tissue abnormalities are seen. Pelvic calcifications likely phleboliths are seen.  IMPRESSION: 1. No acute process identified in the pelvis and right hip.   This report was finalized on 5/6/2024 4:12 PM by Dr. Franc Cisse M.D on Workstation: [id number]     Microbiology Results (last 10 days)       ** No results found for the last 240 hours. 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  It appears that management is nonoperative at this moment.  Type 1 diabetes which is brittle according to the patient and has fluctuations in blood sugar between 30s and 200s and 300s.  She thinks that the episode that led to her fall could be due to poor decision-making as a result of low blood sugar that got her to go out of the house at that time of the night and then fall.  Patient has Dexcom that gives her the readings.  She does remember a lately and her blood sugar has episodes as low as in 30s.  Plan is to monitor for hypoglycemia, continue with Accu-Cheks and sliding scale coverage allow her to have diet and her insulin.  Check hemoglobin A1c.  Chronic renal insufficiency-at present her creatinine is 0.9-plan is monitor her renal function and avoid nephrotoxic agents and hypotensive episodes.  Hypertension-continue antihypertensive regimen and avoid hypotensive episodes.  Generalized anxiety disorder-patient is very upset that she is being in the ER bed 18 since 10:00 this morning and is still has not been able to get her room.  Patient is reassured about the availability of room and her care.  This concerned the patient is conveyed to the members in the ER unit.  Anemia-likely multifactorial including anemia of chronic disease.  Plan is to monitor and if this shows decline in hemoglobin level consider further workup.  Georgina Helms MD   5/6/2024  16:03 EDT    Parts of this note may be an electronic transcription/translation of spoken language to printed text using the Dragon dictation system.

## 2024-05-06 NOTE — NURSING NOTE
"@ 2110 RN unavailable for report.     Nursing report ED to floor  Yulia Iniguez  68 y.o.  female    HPI :  HPI (Adult)  Stated Reason for Visit: fall. pain  History Obtained From: patient    Chief Complaint  Chief Complaint   Patient presents with    Fall    Knee Pain     R        Admitting doctor:   Georgina Helms MD    Admitting diagnosis:   The primary encounter diagnosis was Closed nondisplaced transverse fracture of right patella, initial encounter. A diagnosis of Nondisplaced unspecified fracture of right great toe, initial encounter for closed fracture was also pertinent to this visit.    Code status:   Current Code Status       Date Active Code Status Order ID Comments User Context       5/6/2024 1607 CPR (Attempt to Resuscitate) [id number]  Georgina Helms MD ED        Question Answer    Code Status (Patient has no pulse and is not breathing) CPR (Attempt to Resuscitate)    Medical Interventions (Patient has pulse or is breathing) Full Support                    Allergies:   Buspirone    Isolation:   No active isolations    Intake and Output  No intake or output data in the 24 hours ending 05/06/24 1820    Weight:       05/06/24  1016   Weight: 73.2 kg (161 lb 4.8 oz)       Most recent vitals:   Vitals:    05/06/24 0953 05/06/24 1016 05/06/24 1245 05/06/24 1400   BP:  139/70 132/68 144/71   Pulse: 80  64 59   Resp: 18  18 16   Temp: 98.3 °F (36.8 °C)      TempSrc: Tympanic      SpO2: 99%  96% 99%   Weight:  73.2 kg (161 lb 4.8 oz)     Height:  152.4 cm (60\")         Active LDAs/IV Access:   Lines, Drains & Airways       Active LDAs       Name Placement date Placement time Site Days    Peripheral IV 05/06/24 1415 Left;Posterior Hand 05/06/24  1415  Hand  less than 1                    Labs (abnormal labs have a star):   Labs Reviewed   COMPREHENSIVE METABOLIC PANEL - Abnormal; Notable for the following components:       Result Value    Glucose 204 (*)     CO2 21.5 (*)     Total Protein 5.7 (*)     All other " components within normal limits    Narrative:     GFR Normal >60  Chronic Kidney Disease <60  Kidney Failure <15     CBC WITH AUTO DIFFERENTIAL - Abnormal; Notable for the following components:    RBC 3.51 (*)     Hemoglobin 10.6 (*)     Hematocrit 32.8 (*)     RDW 11.8 (*)     All other components within normal limits   CBC AND DIFFERENTIAL    Narrative:     The following orders were created for panel order CBC & Differential.  Procedure                               Abnormality         Status                     ---------                               -----------         ------                     CBC Auto Differential[[id number]]        Abnormal            Final result                 Please view results for these tests on the individual orders.       EKG:   No orders to display       Meds given in ED:   Medications   Tetanus-Diphth-Acell Pertussis (BOOSTRIX) injection 0.5 mL (0.5 mL Intramuscular Given 5/6/24 1044)   HYDROcodone-acetaminophen (NORCO) 5-325 MG per tablet 1 tablet (1 tablet Oral Given 5/6/24 1044)   morphine injection 4 mg (4 mg Intravenous Given 5/6/24 1516)       Imaging results:  XR Knee 1 or 2 View Right    Result Date: 5/6/2024  1. Subtle lucency coursing across the medial base of the distal phalanx of the right great toe could represent a nondisplaced fracture. Please correlate with the clinical findings.  RIGHT KNEE 2 VIEWS  FINDINGS: There is a mildly displaced fracture of the inferior aspect of the right patella best seen on the lateral view. Mild tibiofemoral joint space narrowing is seen. Small suprapatellar effusion is seen.  IMPRESSION: 1. Right patella fracture.  AP PELVIS AND RIGHT HIP 2 VIEWS  FINDINGS: No acute bone, joint or soft tissue abnormalities are seen. Pelvic calcifications likely phleboliths are seen.  IMPRESSION: 1. No acute process identified in the pelvis and right hip.   This report was finalized on 5/6/2024 4:12 PM by Dr. Franc Cisse M.D on Workstation:  [id number]      XR Foot 3+ View Bilateral    Result Date: 5/6/2024  1. Subtle lucency coursing across the medial base of the distal phalanx of the right great toe could represent a nondisplaced fracture. Please correlate with the clinical findings.  RIGHT KNEE 2 VIEWS  FINDINGS: There is a mildly displaced fracture of the inferior aspect of the right patella best seen on the lateral view. Mild tibiofemoral joint space narrowing is seen. Small suprapatellar effusion is seen.  IMPRESSION: 1. Right patella fracture.  AP PELVIS AND RIGHT HIP 2 VIEWS  FINDINGS: No acute bone, joint or soft tissue abnormalities are seen. Pelvic calcifications likely phleboliths are seen.  IMPRESSION: 1. No acute process identified in the pelvis and right hip.   This report was finalized on 5/6/2024 4:12 PM by Dr. Franc Cisse M.D on Workstation: [id number]      XR Hip With or Without Pelvis 2 - 3 View Right    Result Date: 5/6/2024  1. Subtle lucency coursing across the medial base of the distal phalanx of the right great toe could represent a nondisplaced fracture. Please correlate with the clinical findings.  RIGHT KNEE 2 VIEWS  FINDINGS: There is a mildly displaced fracture of the inferior aspect of the right patella best seen on the lateral view. Mild tibiofemoral joint space narrowing is seen. Small suprapatellar effusion is seen.  IMPRESSION: 1. Right patella fracture.  AP PELVIS AND RIGHT HIP 2 VIEWS  FINDINGS: No acute bone, joint or soft tissue abnormalities are seen. Pelvic calcifications likely phleboliths are seen.  IMPRESSION: 1. No acute process identified in the pelvis and right hip.   This report was finalized on 5/6/2024 4:12 PM by Dr. Franc Cisse M.D on Workstation: [id number]       Ambulatory status:   - br    Social issues:   Social History     Socioeconomic History    Marital status:    Tobacco Use    Smoking status: Former     Current packs/day: 0.00     Types: Cigarettes     Start date:  1970     Quit date: 10/3/2012     Years since quittin.5    Smokeless tobacco: Never    Tobacco comments:     previously smoked about 2 packs per day for about  35 years; did not smoke during pregnancies     Caffeine 2 Cups/day    Vaping Use    Vaping status: Never Used   Substance and Sexual Activity    Alcohol use: Yes     Comment: social    Drug use: Never     Comment: [employer] IN [employer] AND [employer] AGE 27    Sexual activity: Not Currently     Birth control/protection: Tubal ligation       Peripheral Neurovascular  Peripheral Neurovascular (Adult)  Peripheral Neurovascular WDL: WDL    Neuro Cognitive  Neuro Cognitive (Adult)  Cognitive/Neuro/Behavioral WDL: WDL    Learning  Learning Assessment (Adult)  Learning Readiness and Ability: no barriers identified    Respiratory  Respiratory WDL  Respiratory WDL: WDL    Abdominal Pain       Pain Assessments  Pain (Adult)  (0-10) Pain Rating: Rest: 3  Pain Location: knee  Pain Side/Orientation: right  Response to Pain Interventions: interventions effective per patient    NIH Stroke Scale       Best Bush RN  24 18:20 EDT

## 2024-05-06 NOTE — ED PROVIDER NOTES
MD ATTESTATION NOTE    The MIMA and I have discussed this patient's history, physical exam, and treatment plan.  I have reviewed the documentation and personally had a face to face interaction with the patient. I affirm the documentation and agree with the treatment and plan.  The attached note describes my personal findings.      I provided a substantive portion of the care of the patient.  I personally performed the physical exam in its entirety, and below are my findings.        Brief HPI: Patient presented emergency department with right knee pain after fall.  Unable to bear weight.  Was walking her dog when she was pulled and slipped.  Negative LOC.    PHYSICAL EXAM  ED Triage Vitals   Temp Heart Rate Resp BP SpO2   05/06/24 0953 05/06/24 0953 05/06/24 0953 05/06/24 1016 05/06/24 0953   98.3 °F (36.8 °C) 80 18 139/70 99 %      Temp src Heart Rate Source Patient Position BP Location FiO2 (%)   05/06/24 0953 05/06/24 0953 -- -- --   Tympanic Monitor            GENERAL: no acute distress  HENT: nares patent  EYES: no scleral icterus  CV: regular rhythm, normal rate  RESPIRATORY: normal effort  ABDOMEN: soft  MUSCULOSKELETAL: no deformity, significant tenderness to palpation over the right anterior knee, abrasions diffusely to the hands and knees bilaterally, contusion and tenderness palpation over the third and first digits of the right foot  NEURO: alert, moves all extremities, follows commands  PSYCH:  calm, cooperative  SKIN: warm, dry    Vital signs and nursing notes reviewed.        Plan: Patient presented emergency department with multiple injuries status post fall, otherwise well-appearing, vitals otherwise stable.  Imaging demonstrating patella fracture.  Placed in knee immobilizer.  Unfortunately even after pain medication patient is unable to ambulate with assistance in the ER.  Will admit for further workup and management of her symptoms.    ED Course as of 05/06/24 1531   Mon May 06, 2024   1205 Right  knee x-ray interpreted myself:  Patella fracture [FS]   1315 Spoke with orthopedic surgery, keyona Carlton for knee immobilizer and outpatient follow-up if patient is able to ambulate. [FS]      ED Course User Index  [FS] Dno Cervantes MD       SHARED VISIT: This visit was performed by BOTH a physician and an APC. The substantive portion of the medical decision making was performed by this attesting physician who made or approved the management plan and takes responsibility for patient management. All studies in the APC note (if performed) were independently interpreted by me.        Don Cervantes MD  05/06/24 7600

## 2024-05-06 NOTE — ED NOTES
Pt was assisted OOB, used er walker to stand, pt was able to take 3 baby steps, did not tolerate well, returned to bed, md notified.

## 2024-05-06 NOTE — ED PROVIDER NOTES
EMERGENCY DEPARTMENT ENCOUNTER  Room Number:  18/18  PCP: Nelli Grijalva APRN  Independent Historians: Patient      HPI:  Chief Complaint: had concerns including Fall and Knee Pain (R ).       A complete HPI/ROS/PMH/PSH/SH/FH are unobtainable due to: None    Chronic or social conditions impacting patient care (Social Determinants of Health): None      Context: Yulia Iniguez is a 68 y.o. female with a medical history of anxiety and type 1 diabetes who presents to the ED c/o acute severe pain in the right knee, great difficulty weightbearing since falling this morning.  States she slipped in the rain, landed on her hands and knees.  Has pain in her bilateral feet right knee right hip.  Also has some bruising and abrasions to her bilateral hands and elbows but states the pain in those areas is mild and they just feel sore.  Denies striking her head.  She is not anticoagulated.  She does not know when her last tetanus vaccination was.    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 03/11/2022    Type 1 diabetes mellitus with hyperglycemia 1982    Osteoporosis without current pathological fracture 06/03/2022    Abnormal mammogram of left breast 07/28/2022    Atypical ductal hyperplasia of left breast 09/07/2022    Allergic rhinitis 08/01/2023    Dyspnea on exertion 08/01/2023    Right hand pain 01/12/2024     Resolved Ambulatory Problems     Diagnosis Date Noted    Primary hypertension 12/10/2021    Mixed hyperlipidemia 12/10/2021    Type 2 diabetes mellitus without complication, with long-term current use of insulin  12/10/2021    Hypocalcemia 12/10/2021    Pain in limb 2022    Increased MCV 2022    Former heavy cigarette smoker (20-39 per day) 2022    Type II diabetes mellitus, uncontrolled     Type II diabetes mellitus with renal manifestations     Type I diabetes mellitus, uncontrolled 1982     Past Medical History:   Diagnosis Date    Anxiety     Essential hypertension, benign     Former smoker     History of substance abuse     Hyperlipidemia     Osteopenia     Shortness of breath     Type 2 diabetes mellitus          PAST SURGICAL HISTORY  Past Surgical History:   Procedure Laterality Date    BREAST BIOPSY  left    BREAST CYST ASPIRATION      BREAST LUMPECTOMY Left 2023    Procedure: left breast wire bracketed excisional biopsy;  Surgeon: Alanna Hamm MD;  Location: [facility] OR [facility] [place];  Service: General;  Laterality: Left;     SECTION      x2    COLONOSCOPY      normal    HAND SURGERY      trigger finger    HERNIA REPAIR      left side of abdomen    LAPAROSCOPIC TUBAL LIGATION      X2    TONSILLECTOMY      US GUIDED CYST ASPIRATION BREAST N/A 2022         FAMILY HISTORY  Family History   Problem Relation Age of Onset    Thyroid disease Mother         weight gain thyroid    No Known Problems Father     Thyroid disease Sister         graves disease    ADD / ADHD Son     Malig Hyperthermia Neg Hx          SOCIAL HISTORY  Social History     Socioeconomic History    Marital status:    Tobacco Use    Smoking status: Former     Current packs/day: 0.00     Types: Cigarettes     Start date: 1970     Quit date: 10/3/2012     Years since quittin.5    Smokeless tobacco: Never    Tobacco comments:     previously smoked about 2 packs per day for about  35 years; did not smoke during pregnancies     Caffeine 2 Cups/day    Vaping Use    Vaping status: Never Used   Substance and Sexual Activity    Alcohol use: Yes     Comment: social    Drug use: Never     Comment: [employer] IN [employer] AND [employer] AGE 27    Sexual activity: Not Currently     Birth control/protection: Tubal ligation         ALLERGIES  Buspirone      REVIEW OF SYSTEMS  Review of Systems  Included in HPI  All systems reviewed and negative except for those discussed in HPI.      PHYSICAL EXAM    I have reviewed the triage vital signs and nursing notes.    ED Triage Vitals   Temp Heart Rate Resp BP SpO2   05/06/24 0953 05/06/24 0953 05/06/24 0953 05/06/24 1016 05/06/24 0953   98.3 °F (36.8 °C) 80 18 139/70 99 %      Temp src Heart Rate Source Patient Position BP Location FiO2 (%)   05/06/24 0953 05/06/24 0953 -- -- --   Tympanic Monitor          Physical Exam  GENERAL: alert, no acute distress  SKIN: Warm, dry  HENT: Normocephalic, atraumatic  EYES: no scleral icterus  CV: regular rhythm, regular rate  RESPIRATORY: normal effort, lungs clear  ABDOMEN: nondistended  MUSCULOSKELETAL: no deformity.  She has abrasions to the bilateral elbows with some ecchymosis in this area, also some edema ecchymosis and abrasions to the base of the hands bilaterally.  These areas are minimally tender and she has full range of motion and intact neurovascular exam.  She has some mild tenderness about the right hip with no edema or ecchymosis and has full range of motion.  She has abrasions to the bilateral knees.  Left knee is nontender with full range of motion.  Right knee has no effusion or deformity, there is tenderness over the patella and the knee diffusely.  Unable to fully extend the lower leg.  Significant apprehension with any flexion due to pain.  There is some bruising and tenderness to the right great toe, minor avulsion of the distal nail from the nailbed without bleeding.  There is also some tenderness to the left great toe and a minor avulsion to the left great toe nail from the nailbed distally.  DP and PT pulses 2+, cap refill is brisk bilaterally  NEURO: alert, moves all extremities, follows commands            LAB  RESULTS  Recent Results (from the past 24 hour(s))   Comprehensive Metabolic Panel    Collection Time: 05/06/24  2:07 PM    Specimen: Blood   Result Value Ref Range    Glucose 204 (H) 65 - 99 mg/dL    BUN 17 8 - 23 mg/dL    Creatinine 0.90 0.57 - 1.00 mg/dL    Sodium 137 136 - 145 mmol/L    Potassium 4.4 3.5 - 5.2 mmol/L    Chloride 104 98 - 107 mmol/L    CO2 21.5 (L) 22.0 - 29.0 mmol/L    Calcium 8.7 8.6 - 10.5 mg/dL    Total Protein 5.7 (L) 6.0 - 8.5 g/dL    Albumin 3.6 3.5 - 5.2 g/dL    ALT (SGPT) 10 1 - 33 U/L    AST (SGOT) 22 1 - 32 U/L    Alkaline Phosphatase 43 39 - 117 U/L    Total Bilirubin 0.5 0.0 - 1.2 mg/dL    Globulin 2.1 gm/dL    A/G Ratio 1.7 g/dL    BUN/Creatinine Ratio 18.9 7.0 - 25.0    Anion Gap 11.5 5.0 - 15.0 mmol/L    eGFR 69.8 >60.0 mL/min/1.73   CBC Auto Differential    Collection Time: 05/06/24  2:07 PM    Specimen: Blood   Result Value Ref Range    WBC 8.84 3.40 - 10.80 10*3/mm3    RBC 3.51 (L) 3.77 - 5.28 10*6/mm3    Hemoglobin 10.6 (L) 12.0 - 15.9 g/dL    Hematocrit 32.8 (L) 34.0 - 46.6 %    MCV 93.4 79.0 - 97.0 fL    MCH 30.2 26.6 - 33.0 pg    MCHC 32.3 31.5 - 35.7 g/dL    RDW 11.8 (L) 12.3 - 15.4 %    RDW-SD 40.6 37.0 - 54.0 fl    MPV 10.5 6.0 - 12.0 fL    Platelets 257 140 - 450 10*3/mm3    Neutrophil % 67.1 42.7 - 76.0 %    Lymphocyte % 22.4 19.6 - 45.3 %    Monocyte % 9.6 5.0 - 12.0 %    Eosinophil % 0.3 0.3 - 6.2 %    Basophil % 0.3 0.0 - 1.5 %    Immature Grans % 0.3 0.0 - 0.5 %    Neutrophils, Absolute 5.92 1.70 - 7.00 10*3/mm3    Lymphocytes, Absolute 1.98 0.70 - 3.10 10*3/mm3    Monocytes, Absolute 0.85 0.10 - 0.90 10*3/mm3    Eosinophils, Absolute 0.03 0.00 - 0.40 10*3/mm3    Basophils, Absolute 0.03 0.00 - 0.20 10*3/mm3    Immature Grans, Absolute 0.03 0.00 - 0.05 10*3/mm3    nRBC 0.0 0.0 - 0.2 /100 WBC         RADIOLOGY  XR Knee 1 or 2 View Right, XR Foot 3+ View Bilateral, XR Hip With or Without Pelvis 2 - 3 View Right    Result Date: 5/6/2024  XR FOOT 3+ VW BILATERAL-, XR  HIP W OR WO PELVIS 2-3 VIEW RIGHT-, XR KNEE 1 OR 2 VW RIGHT-05/06/2024  HISTORY: Fell. Hip pain, knee pain bilateral foot pain.  LEFT FOOT 3 VIEWS  FINDINGS: No acute bone, joint or soft tissue abnormalities are seen.  RIGHT FOOT 3 VIEWS  FINDINGS: On 1 or 2 views there is a faintly visualized oblique lucency coursing across the medial base of the distal phalanx of the right great toe which could represent a nondisplaced fracture but clinical correlation is suggested as this finding is somewhat subtle. No other bone, joint or soft tissue abnormalities are seen in the right foot.      1. Subtle lucency coursing across the medial base of the distal phalanx of the right great toe could represent a nondisplaced fracture. Please correlate with the clinical findings.  RIGHT KNEE 2 VIEWS  FINDINGS: There is a mildly displaced fracture of the inferior aspect of the right patella best seen on the lateral view. Mild tibiofemoral joint space narrowing is seen. Small suprapatellar effusion is seen.  IMPRESSION: 1. Right patella fracture.  AP PELVIS AND RIGHT HIP 2 VIEWS  FINDINGS: No acute bone, joint or soft tissue abnormalities are seen. Pelvic calcifications likely phleboliths are seen.  IMPRESSION: 1. No acute process identified in the pelvis and right hip.   This report was finalized on 5/6/2024 4:12 PM by Dr. Franc Cisse M.D on Workstation: [id number]         MEDICATIONS GIVEN IN ER  Medications   Tetanus-Diphth-Acell Pertussis (BOOSTRIX) injection 0.5 mL (0.5 mL Intramuscular Given 5/6/24 1044)   HYDROcodone-acetaminophen (NORCO) 5-325 MG per tablet 1 tablet (1 tablet Oral Given 5/6/24 1044)   morphine injection 4 mg (4 mg Intravenous Given 5/6/24 1516)         ORDERS PLACED DURING THIS VISIT:  Orders Placed This Encounter   Procedures    XR Knee 1 or 2 View Right    XR Foot 3+ View Bilateral    XR Hip With or Without Pelvis 2 - 3 View Right    Comprehensive Metabolic Panel    CBC Auto Differential    Wound Care  "   Obtain & Apply The Following- Lower extremity; Knee immobilizer    Obtain & Apply The Following- Lower extremity; Post-op shoe    Code Status and Medical Interventions:    Ortho (on-call MD unless specified)    [facility] (on-call MD unless specified) Details    Inpatient Admission    CBC & Differential         OUTPATIENT MEDICATION MANAGEMENT:  No current Epic-ordered facility-administered medications on file.     Current Outpatient Medications Ordered in Epic   Medication Sig Dispense Refill    alendronate (FOSAMAX) 70 MG tablet TAKE 1 TABLET EVERY 7 DAYS WITH LARGE GLASS OF WATER 30 MIN BEFORE FIRST FOOD, DRINK, MEDS; AVOID LYING DOWN FOR 30 MIN 12 tablet 3    aspirin 81 MG EC tablet Take 1 tablet by mouth Daily.      atorvastatin (LIPITOR) 10 MG tablet Take 1 tablet by mouth Daily. 90 tablet 3    Blood Glucose Monitoring Suppl (Accu-Chek Guide) w/Device kit       Continuous Blood Gluc Sensor (Dexcom G6 Sensor) Dipsense Dexcom G6 Sensors, to replace every 10 days, 3 sensors per 30 day period ([facility]C #[address]-15). Check 6 times a day. Dx: E 10.65 9 each 4    Continuous Blood Gluc Transmit (Dexcom G6 Transmitter) misc 1 each Every 3 (Three) Months. Dispense 1 Dexcom G6 Transmitter for each 3 month period (NDC #[address]-88). Check 6 times a day. Dx: E 10.65 1 each 4    glucose blood (Accu-Chek Guide) test strip Check 5 times a day on insulin tx, poor control, hypoglycemia. Dx: E 10.65 500 each 4    hydroCHLOROthiazide 25 MG tablet Take 1 tablet by mouth Daily. 90 tablet 3    Insulin Aspart (novoLOG) 100 UNIT/ML injection Use as directed for meal & correction coverage up to 35 units a day 31.5 mL 1    insulin glargine (Lantus) 100 UNIT/ML injection Inject 20 Units under the skin into the appropriate area as directed Daily. (Patient taking differently: Inject 18 Units under the skin into the appropriate area as directed Daily.) 18 mL 0    Insulin Syringe-Needle U-100 (BD Veo Insulin Syringe U/F) 31G X 15/64\" 0.3 ML misc " For use to give insulin from vial up to 5 times a day. Can substitute based on availability and insurance. 500 each 4    lisinopril (PRINIVIL,ZESTRIL) 40 MG tablet Take 1 tablet by mouth Daily. 90 tablet 3    multivitamin with minerals tablet tablet Take 1 tablet by mouth Daily. HOLD ONE WEEK FOR SURGERY      Pyridoxine HCl (VITAMIN B6 PO) Take 1 tablet by mouth Daily.      vitamin B-12 (CYANOCOBALAMIN) 100 MCG tablet Take  by mouth Daily.      VITAMIN D, CHOLECALCIFEROL, PO Take 1 tablet by mouth Daily.      Accu-Chek Softclix Lancets lancets Check 5 times a day on insulin tx, poor control, hypoglycemia. Dx: E 10.65 500 each 4    albuterol sulfate  (90 Base) MCG/ACT inhaler Inhale 2 puffs Every 6 (Six) Hours As Needed for Shortness of Air. 18 g 0         PROCEDURES  Procedures            PROGRESS, DATA ANALYSIS, CONSULTS, AND MEDICAL DECISION MAKING  All labs have been independently interpreted by me.  All radiology studies have been reviewed by me. All EKG's have been independently viewed and interpreted by me.  Discussion below represents my analysis of pertinent findings related to patient's condition, differential diagnosis, treatment plan and final disposition.    DIFFERENTIAL    Differential diagnosis includes but is not limited to contusion, patellar fracture, tibial plateau fracture, sprain, toe fractures, toe sprain, toe contusion      ED Course as of 05/06/24 1851   Mon May 06, 2024   1205 Right knee x-ray interpreted myself:  Patella fracture [FS]   1315 Spoke with orthopedic surgery, keyona Carlton for knee immobilizer and outpatient follow-up if patient is able to ambulate. [FS]      ED Course User Index  [FS] Don Cervantes MD             AS OF 18:51 EDT VITALS:    BP - 144/71  HR - 59  TEMP - 98.3 °F (36.8 °C) (Tympanic)  O2 SATS - 99%    COMPLEXITY OF CARE  The patient requires admission.      DIAGNOSIS  Final diagnoses:   Closed nondisplaced transverse fracture of right patella, initial  encounter   Nondisplaced unspecified fracture of right great toe, initial encounter for closed fracture         DISPOSITION  ED Disposition       ED Disposition   Decision to Admit    Condition   --    Comment   Level of Care: Telemetry [5]   Diagnosis: Right patella fracture [228902]   Admitting Physician: CASSANDRA HAN [[id number]]   Attending Physician: CASSANDRA HAN [[id number]]   Certification: I Certify That Inpatient Hospital Services Are Medically Necessary For Greater Than 2 Midnights                    FOLLOW UP  No follow-up provider specified.            Please note that portions of this document were completed with a voice recognition program.    Note Disclaimer: At [facility], we believe that sharing information builds trust and better relationships. You are receiving this note because you recently visited [facility]. It is possible you will see health information before a provider has talked with you about it. This kind of information can be easy to misunderstand. To help you fully understand what it means for your health, we urge you to discuss this note with your provider.         Daniella Rodrigez PA-C  05/06/24 [id number]

## 2024-05-07 ENCOUNTER — TELEPHONE (OUTPATIENT)
Dept: FAMILY MEDICINE CLINIC | Facility: CLINIC | Age: 69
End: 2024-05-07
Payer: MEDICARE

## 2024-05-07 PROBLEM — S82.009A PATELLA FRACTURE: Status: ACTIVE | Noted: 2024-05-07

## 2024-05-07 LAB
ALBUMIN SERPL-MCNC: 3.7 G/DL (ref 3.5–5.2)
ALBUMIN/GLOB SERPL: 2.1 G/DL
ALP SERPL-CCNC: 44 U/L (ref 39–117)
ALT SERPL W P-5'-P-CCNC: 10 U/L (ref 1–33)
ANION GAP SERPL CALCULATED.3IONS-SCNC: 4 MMOL/L (ref 5–15)
AST SERPL-CCNC: 15 U/L (ref 1–32)
BASOPHILS # BLD AUTO: 0.04 10*3/MM3 (ref 0–0.2)
BASOPHILS NFR BLD AUTO: 0.6 % (ref 0–1.5)
BILIRUB SERPL-MCNC: 0.7 MG/DL (ref 0–1.2)
BUN SERPL-MCNC: 15 MG/DL (ref 8–23)
BUN/CREAT SERPL: 16.9 (ref 7–25)
CALCIUM SPEC-SCNC: 8.5 MG/DL (ref 8.6–10.5)
CHLORIDE SERPL-SCNC: 108 MMOL/L (ref 98–107)
CO2 SERPL-SCNC: 24 MMOL/L (ref 22–29)
CREAT SERPL-MCNC: 0.89 MG/DL (ref 0.57–1)
DEPRECATED RDW RBC AUTO: 40.9 FL (ref 37–54)
EGFRCR SERPLBLD CKD-EPI 2021: 70.7 ML/MIN/1.73
EOSINOPHIL # BLD AUTO: 0.18 10*3/MM3 (ref 0–0.4)
EOSINOPHIL NFR BLD AUTO: 2.6 % (ref 0.3–6.2)
ERYTHROCYTE [DISTWIDTH] IN BLOOD BY AUTOMATED COUNT: 11.9 % (ref 12.3–15.4)
GLOBULIN UR ELPH-MCNC: 1.8 GM/DL
GLUCOSE BLDC GLUCOMTR-MCNC: 160 MG/DL (ref 70–130)
GLUCOSE BLDC GLUCOMTR-MCNC: 162 MG/DL (ref 70–130)
GLUCOSE BLDC GLUCOMTR-MCNC: 166 MG/DL (ref 70–130)
GLUCOSE BLDC GLUCOMTR-MCNC: 276 MG/DL (ref 70–130)
GLUCOSE SERPL-MCNC: 185 MG/DL (ref 65–99)
HBA1C MFR BLD: 7.7 % (ref 4.8–5.6)
HCT VFR BLD AUTO: 31.9 % (ref 34–46.6)
HGB BLD-MCNC: 10.7 G/DL (ref 12–15.9)
IMM GRANULOCYTES # BLD AUTO: 0.01 10*3/MM3 (ref 0–0.05)
IMM GRANULOCYTES NFR BLD AUTO: 0.1 % (ref 0–0.5)
LYMPHOCYTES # BLD AUTO: 2.39 10*3/MM3 (ref 0.7–3.1)
LYMPHOCYTES NFR BLD AUTO: 35 % (ref 19.6–45.3)
MCH RBC QN AUTO: 31.6 PG (ref 26.6–33)
MCHC RBC AUTO-ENTMCNC: 33.5 G/DL (ref 31.5–35.7)
MCV RBC AUTO: 94.1 FL (ref 79–97)
MONOCYTES # BLD AUTO: 0.79 10*3/MM3 (ref 0.1–0.9)
MONOCYTES NFR BLD AUTO: 11.6 % (ref 5–12)
NEUTROPHILS NFR BLD AUTO: 3.41 10*3/MM3 (ref 1.7–7)
NEUTROPHILS NFR BLD AUTO: 50.1 % (ref 42.7–76)
NRBC BLD AUTO-RTO: 0 /100 WBC (ref 0–0.2)
PLATELET # BLD AUTO: 241 10*3/MM3 (ref 140–450)
PMV BLD AUTO: 10.7 FL (ref 6–12)
POTASSIUM SERPL-SCNC: 4.4 MMOL/L (ref 3.5–5.2)
PROT SERPL-MCNC: 5.5 G/DL (ref 6–8.5)
RBC # BLD AUTO: 3.39 10*6/MM3 (ref 3.77–5.28)
SODIUM SERPL-SCNC: 136 MMOL/L (ref 136–145)
WBC NRBC COR # BLD AUTO: 6.82 10*3/MM3 (ref 3.4–10.8)

## 2024-05-07 PROCEDURE — 83036 HEMOGLOBIN GLYCOSYLATED A1C: CPT | Performed by: INTERNAL MEDICINE

## 2024-05-07 PROCEDURE — 97116 GAIT TRAINING THERAPY: CPT

## 2024-05-07 PROCEDURE — 63710000001 INSULIN GLARGINE PER 5 UNITS: Performed by: INTERNAL MEDICINE

## 2024-05-07 PROCEDURE — 85025 COMPLETE CBC W/AUTO DIFF WBC: CPT | Performed by: INTERNAL MEDICINE

## 2024-05-07 PROCEDURE — G0378 HOSPITAL OBSERVATION PER HR: HCPCS

## 2024-05-07 PROCEDURE — 80053 COMPREHEN METABOLIC PANEL: CPT | Performed by: INTERNAL MEDICINE

## 2024-05-07 PROCEDURE — 63710000001 INSULIN LISPRO (HUMAN) PER 5 UNITS: Performed by: HOSPITALIST

## 2024-05-07 PROCEDURE — 97530 THERAPEUTIC ACTIVITIES: CPT

## 2024-05-07 PROCEDURE — 97110 THERAPEUTIC EXERCISES: CPT

## 2024-05-07 PROCEDURE — 97162 PT EVAL MOD COMPLEX 30 MIN: CPT

## 2024-05-07 PROCEDURE — 97166 OT EVAL MOD COMPLEX 45 MIN: CPT

## 2024-05-07 PROCEDURE — 82948 REAGENT STRIP/BLOOD GLUCOSE: CPT

## 2024-05-07 PROCEDURE — 97535 SELF CARE MNGMENT TRAINING: CPT

## 2024-05-07 RX ORDER — INSULIN LISPRO 100 [IU]/ML
3 INJECTION, SOLUTION INTRAVENOUS; SUBCUTANEOUS ONCE
Status: COMPLETED | OUTPATIENT
Start: 2024-05-07 | End: 2024-05-07

## 2024-05-07 RX ORDER — INSULIN LISPRO 100 [IU]/ML
6 INJECTION, SOLUTION INTRAVENOUS; SUBCUTANEOUS ONCE
Status: COMPLETED | OUTPATIENT
Start: 2024-05-07 | End: 2024-05-07

## 2024-05-07 RX ORDER — INSULIN GLARGINE 100 [IU]/ML
18 INJECTION, SOLUTION SUBCUTANEOUS DAILY
Status: DISCONTINUED | OUTPATIENT
Start: 2024-05-08 | End: 2024-05-08 | Stop reason: HOSPADM

## 2024-05-07 RX ADMIN — ATORVASTATIN CALCIUM 10 MG: 20 TABLET, FILM COATED ORAL at 08:21

## 2024-05-07 RX ADMIN — INSULIN GLARGINE 18 UNITS: 100 INJECTION, SOLUTION SUBCUTANEOUS at 08:21

## 2024-05-07 RX ADMIN — LISINOPRIL 40 MG: 40 TABLET ORAL at 08:21

## 2024-05-07 RX ADMIN — INSULIN LISPRO 3 UNITS: 100 INJECTION, SOLUTION INTRAVENOUS; SUBCUTANEOUS at 16:14

## 2024-05-07 RX ADMIN — Medication 10 ML: at 20:45

## 2024-05-07 RX ADMIN — Medication 1 TABLET: at 08:21

## 2024-05-07 RX ADMIN — Medication 50 MG: at 08:21

## 2024-05-07 RX ADMIN — INSULIN LISPRO 6 UNITS: 100 INJECTION, SOLUTION INTRAVENOUS; SUBCUTANEOUS at 17:37

## 2024-05-07 RX ADMIN — Medication 10 ML: at 08:23

## 2024-05-07 RX ADMIN — INSULIN LISPRO 6 UNITS: 100 INJECTION, SOLUTION INTRAVENOUS; SUBCUTANEOUS at 12:43

## 2024-05-07 RX ADMIN — Medication 5000 UNITS: at 08:21

## 2024-05-07 RX ADMIN — ASPIRIN 81 MG: 81 TABLET, COATED ORAL at 08:21

## 2024-05-07 RX ADMIN — HYDROCHLOROTHIAZIDE 25 MG: 25 TABLET ORAL at 08:21

## 2024-05-07 NOTE — THERAPY EVALUATION
Patient Name: Yulia Iniguez  : 1955    MRN: [id number]                              Today's Date: 2024       Admit Date: 2024    Visit Dx:     ICD-10-CM ICD-9-CM   1. Closed nondisplaced transverse fracture of right patella, initial encounter  S82.034A 822.0   2. 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    Type 1 diabetes mellitus with hyperglycemia    Osteoporosis without current pathological fracture    Abnormal mammogram of left breast    Atypical ductal hyperplasia of left breast    Allergic rhinitis    Dyspnea on exertion    Right hand pain    Right patella fracture    Patella fracture     Past Medical History:   Diagnosis Date    Anemia     Anxiety     CRI (chronic renal insufficiency), stage 2 (mild)     PATIENT DENIES    Essential hypertension, benign     Former smoker     History of substance abuse     DOWNERS AGE 27    Hyperlipidemia     Osteopenia     actual problem not clear and no outside DXA info    Osteoporosis without current pathological fracture     Shortness of breath     PATIENT STATES DUE TO ANXIETY    Type 2 diabetes mellitus      Past Surgical History:   Procedure Laterality Date    BREAST BIOPSY  left    BREAST CYST ASPIRATION      BREAST LUMPECTOMY Left 2023    Procedure: left breast wire bracketed excisional biopsy;  Surgeon: Alanna Hamm MD;  Location: [facility] OR [facility];  Service: General;  Laterality: Left;     SECTION      x2    COLONOSCOPY      normal    HAND SURGERY      trigger finger    HERNIA REPAIR      left side of abdomen    LAPAROSCOPIC TUBAL LIGATION      X2    TONSILLECTOMY      US GUIDED CYST ASPIRATION BREAST N/A 2022      General  Information       Row Name 05/07/24 1250          Physical Therapy Time and Intention    Document Type evaluation  -PC     Mode of Treatment physical therapy  -PC       Row Name 05/07/24 1250          General Information    Patient Profile Reviewed yes  -PC     Prior Level of Function independent:  -PC     Existing Precautions/Restrictions fall;brace on at all times  -PC       Row Name 05/07/24 1250          Living Environment    People in Home alone  -PC       Row Name 05/07/24 1250          Home Main Entrance    Number of Stairs, Main Entrance none  -PC       Row Name 05/07/24 1250          Stairs Within Home, Primary    Number of Stairs, Within Home, Primary none  -PC       Row Name 05/07/24 1250          Cognition    Orientation Status (Cognition) oriented x 4  -PC       Row Name 05/07/24 1250          Safety Issues, Functional Mobility    Impairments Affecting Function (Mobility) endurance/activity tolerance;strength  -PC               User Key  (r) = Recorded By, (t) = Taken By, (c) = Cosigned By      Initials Name Provider Type    PC Anne Mackenzie, PT Physical Therapist                   Mobility       Row Name 05/07/24 1253          Sit-Stand Transfer    Sit-Stand [place] (Transfers) standby assist;verbal cues  -     Assistive Device (Sit-Stand Transfers) walker, front-wheeled  -PC       Row Name 05/07/24 1253          Gait/Stairs (Locomotion)    [place] Level (Gait) standby assist  -     Assistive Device (Gait) walker, front-wheeled  -PC     Distance in Feet (Gait) 15  -PC     Deviations/Abnormal Patterns (Gait) antalgic;agustin decreased;gait speed decreased  -PC     Comment, (Gait/Stairs) Pt with good upper body strength, she fatigued quickly as she was avoiding putting any weight through RLE due to pain and distance was limited  -       Row Name 05/07/24 1253          Mobility    Extremity Weight-bearing Status right lower extremity  -PC     Right Lower Extremity (Weight-bearing Status)  --  per MD note from ED, Dr Roblero was consulted and rec KI and WBAT, pt also with great toe fracture on R, pt not able to tolerate putting weight through RLE mainly due to the toe fracture  -PC               User Key  (r) = Recorded By, (t) = Taken By, (c) = Cosigned By      Initials Name Provider Type    PC Anne Mackenzie, PT Physical Therapist                   Obj/Interventions       Row Name 05/07/24 1301          Range of Motion Comprehensive    Comment, General Range of Motion LLE WNL, RLE KI in place  -PC       Row Name 05/07/24 1301          Strength Comprehensive (MMT)    Comment, General Manual Muscle Testing (MMT) Assessment B UE and LLE WNL  -PC       Row Name 05/07/24 1301          Sensory Assessment (Somatosensory)    Sensory Assessment (Somatosensory) sensation intact  -PC               User Key  (r) = Recorded By, (t) = Taken By, (c) = Cosigned By      Initials Name Provider Type    PC Anne Mackenzie, PT Physical Therapist                   Goals/Plan       Row Name 05/07/24 1307          Transfer Goal 1 (PT)    Activity/Assistive Device (Transfer Goal 1, PT) sit-to-stand/stand-to-sit  -PC     [place] Level/Cues Needed (Transfer Goal 1, PT) supervision required  -PC     Time Frame (Transfer Goal 1, PT) 1 week  -PC       [facility] Name 05/07/24 1307          Gait Training Goal 1 (PT)    Activity/Assistive Device (Gait Training Goal 1, PT) gait (walking locomotion);assistive device use  -PC     [place] Level (Gait Training Goal 1, PT) supervision required  -PC     Distance (Gait Training Goal 1, PT) 30  -PC     Time Frame (Gait Training Goal 1, PT) 1 week  -PC       [facility] Name 05/07/24 1307          Therapy Assessment/Plan (PT)    Planned Therapy Interventions (PT) gait training;transfer training;strengthening  -PC               User Key  (r) = Recorded By, (t) = Taken By, (c) = Cosigned By      Initials Name Provider Type    PC Anne Mackenzie, PT Physical Therapist                   Clinical  Impression       Row Name 05/07/24 1303          Pain    Pretreatment Pain Rating 3/10  -PC     Posttreatment Pain Rating 3/10  -PC     Pain Location - Side/Orientation Right  -PC     Pain Location - toe  -PC     Pain Intervention(s) Repositioned  -PC       Row Name 05/07/24 1303          Plan of Care Review    Plan of Care Reviewed With patient  -PC     Outcome Evaluation Pt is adm after a fall suffering a R patellar fracture and R great toe fracture, Per Dr Roblero, pt is to wear a KI and can bear weight as tolerated and follow up with orthopedics. Pt lives alone and was independent and active prior to admission. Pt has no stairs to maneuver at home. Pt did well today and able to use a walker for short distances, she was hesitant to bear weight due to great toe pain. She fatigued quickly and overall mobility was limited due to fatigue, heavy reliance with UE on walker. Pt plans home with walker, wheelchair, and BSC. PT will follow to maximize safety for home, rec home health PT  -PC       Row Name 05/07/24 1300          Therapy Assessment/Plan (PT)    Rehab Potential (PT) good, to achieve stated therapy goals  -PC     Criteria for Skilled Interventions Met (PT) yes;meets criteria  -PC     Therapy Frequency (PT) 6 times/wk  -PC       Row Name 05/07/24 1305          Positioning and Restraints    Pre-Treatment Position in bed  -PC     Post Treatment Position bed  -PC     In Bed supine;call light within reach;encouraged to call for assist;exit alarm on  -PC               User Key  (r) = Recorded By, (t) = Taken By, (c) = Cosigned By      Initials Name Provider Type    PC Anne Mackenzie, PT Physical Therapist                   Outcome Measures       Row Name 05/07/24 1303          How much help from another person do you currently need...    Turning from your back to your side while in flat bed without using bedrails? 4  -PC     Moving from lying on back to sitting on the side of a flat bed without bedrails? 4  -PC      Moving to and from a bed to a chair (including a wheelchair)? 3  -PC     Standing up from a chair using your arms (e.g., wheelchair, bedside chair)? 3  -PC     Climbing 3-5 steps with a railing? 2  -PC     To walk in hospital room? 3  -PC     AM-PAC 6 Clicks Score (PT) 19  -PC     Highest Level of Mobility Goal 6 --> Walk 10 steps or more  -PC       Row Name 05/07/24 1300          Functional Assessment    Outcome Measure Options AM-PAC 6 Clicks Daily Activity (OT)  -AG               User Key  (r) = Recorded By, (t) = Taken By, (c) = Cosigned By      Initials Name Provider Type    PC Anne Mackenzie, PT Physical Therapist    Bhupendra Pinto, OT Occupational Therapist                                 Physical Therapy Education       Title: PT OT SLP Therapies (In Progress)       Topic: Physical Therapy (In Progress)       Point: Mobility training (Done)       Learning Progress Summary             Patient Acceptance, E,D, DU by PC at 5/7/2024 1308                         Point: Home exercise program (Not Started)       Learner Progress:  Not documented in this visit.              Point: Body mechanics (Done)       Learning Progress Summary             Patient Acceptance, E,D, DU by PC at 5/7/2024 1308                         Point: Precautions (Done)       Learning Progress Summary             Patient Acceptance, E,D, DU by PC at 5/7/2024 1308                                         User Key       Initials Effective Dates Name Provider Type Discipline     06/16/21 -  Anne Mackenzie, PT Physical Therapist PT                  PT Recommendation and Plan  Planned Therapy Interventions (PT): gait training, transfer training, strengthening  Plan of Care Reviewed With: patient  Outcome Evaluation: Pt is adm after a fall suffering a R patellar fracture and R great toe fracture, Per Dr Roblero, pt is to wear a KI and can bear weight as tolerated and follow up with orthopedics. Pt lives alone and was independent and  active prior to admission. Pt has no stairs to maneuver at home. Pt did well today and able to use a walker for short distances, she was hesitant to bear weight due to great toe pain. She fatigued quickly and overall mobility was limited due to fatigue, heavy reliance with UE on walker. Pt plans home with walker, wheelchair, and BSC. PT will follow to maximize safety for home, rec home health PT     Time Calculation:         PT Charges       Row Name 05/07/24 1309             Time Calculation    Start Time 1024  -PC      Stop Time 1053  -PC      Time Calculation (min) 29 min  -PC      PT Received On 05/07/24  -PC      PT - Next Appointment 05/08/24  -PC      PT Goal Re-Cert Due Date 05/14/24  -PC         Time Calculation- PT    Total Timed Code Minutes- PT 23 minute(s)  -PC                User Key  (r) = Recorded By, (t) = Taken By, (c) = Cosigned By      Initials Name Provider Type    PC Anne Mackenzie, PT Physical Therapist                  Therapy Charges for Today       Code Description Service Date Service Provider Modifiers Qty    44830674507 HC PT EVAL MOD COMPLEXITY 3 5/7/2024 Anne Mackenzie, PT GP 1    58234400571 HC PT THER PROC EA 15 MIN 5/7/2024 Anne Mackenzie, PT GP 1    68997019990 HC GAIT TRAINING EA 15 MIN 5/7/2024 Anne Mackenzie, PT GP 1            PT G-Codes  Outcome Measure Options: AM-PAC 6 Clicks Daily Activity (OT)  AM-PAC 6 Clicks Score (PT): 19  AM-PAC 6 Clicks Score (OT): 17  PT Discharge Summary  Anticipated Discharge Disposition (PT): home, home with home health    Anne Mackenzie PT  5/7/2024

## 2024-05-07 NOTE — DISCHARGE PLACEMENT REQUEST
"Yulia Wagner (68 y.o. Female)       Date of Birth   1955    Social Security Number       Address   [address] [place] [address]    Home Phone   [phone]    MRN   [id number]       [employer]   None    Marital Status                               Admission Date   5/6/24    Admission Type   Emergency    Admitting Provider   Georgina Helms MD    Attending Provider   Ken Gonsalez MD    Department, Room/Bed   [address], E657/1       Discharge Date       Discharge Disposition       Discharge Destination                                 Attending Provider: Kne Gonsalez MD    Allergies: Buspirone    Isolation: None   Infection: None   Code Status: CPR    Ht: 152.4 cm (60\")   Wt: 73.2 kg (161 lb 4.8 oz)    Admission Cmt: None   Principal Problem: Right patella fracture [S82.001A]                   Active Insurance as of 5/6/2024       Primary Coverage       Payor Plan Insurance Group Employer/Plan Group    ANTHEM MEDICARE REPLACEMENT ANTHEM MEDICARE ADVANTAGE [id number]       Payor Plan Address Payor Plan Phone Number Payor Plan Fax Number Effective Dates    [address] [phone]  1/1/2024 - None Entered    [facility] [address]         Subscriber Name Subscriber Birth Date Member ID       YULIA WAGNER 1955 [id number]                     Emergency Contacts        (Rel.) Home Phone Work Phone Mobile Phone    JENS CHANCE (Son) [phone] -- [phone]                "

## 2024-05-07 NOTE — PROGRESS NOTES
Dedicated to Hospital Care    [phone]   LOS: 1 day     Name: Yulia Iniguez  Age/Sex: 68 y.o. female  :  1955        PCP: Nelli Grijalva APRN  Chief Complaint   Patient presents with    Fall    Knee Pain     R       Subjective   She feels okay this morning her pain is relatively controlled at rest but she is unable to bear any weight on this.  She has a lot of discomfort and trouble with this at times.  She is also frustrated about her diabetic management and her current diet.  This is all been fixed and she is happy with this at the present time.  Otherwise she denies any chest pain palpitations no symptoms of hypo or hyperglycemia here in the hospital.  General: No Fever or Chills, Cardiac: No Chest Pain or Palpitations, Resp: No Cough or SOA, GI: No Nausea, Vomiting, or Diarrhea, and Other: No bleeding    aspirin, 81 mg, Oral, Daily  atorvastatin, 10 mg, Oral, Daily  cholecalciferol, 5,000 Units, Oral, Daily  hydroCHLOROthiazide, 25 mg, Oral, Daily  insulin glargine, 20 Units, Subcutaneous, Daily  insulin lispro, 2-9 Units, Subcutaneous, 4x Daily AC & at Bedtime  lisinopril, 40 mg, Oral, Daily  multivitamin with minerals, 1 tablet, Oral, Daily  sodium chloride, 10 mL, Intravenous, Q12H  vitamin B-6, 50 mg, Oral, Daily           Objective   Vital Signs  Temp:  [97.9 °F (36.6 °C)-98.6 °F (37 °C)] 98.6 °F (37 °C)  Heart Rate:  [50-75] 75  Resp:  [16-18] 18  BP: (132-152)/(63-74) 148/64  Body mass index is 31.5 kg/m².    Intake/Output Summary (Last 24 hours) at 2024 1054  Last data filed at 2024 0900  Gross per 24 hour   Intake --   Output 200 ml   Net -200 ml       Physical Exam  Vitals and nursing note reviewed.   Constitutional:       General: She is not in acute distress.     Appearance: She is obese. She is ill-appearing.   HENT:      Head: Normocephalic.   Cardiovascular:      Rate and Rhythm: Normal rate and regular rhythm.   Pulmonary:      Effort: Pulmonary effort is normal.       Breath sounds: Normal breath sounds.   Abdominal:      General: Bowel sounds are normal. There is no distension.      Palpations: Abdomen is soft.   Neurological:      General: No focal deficit present.      Mental Status: She is alert. Mental status is at baseline.           Results Review:       I reviewed the patient's new clinical results.  Results from last 7 days   Lab Units 05/07/24  0510 05/06/24  1407   WBC 10*3/mm3 6.82 8.84   HEMOGLOBIN g/dL 10.7* 10.6*   PLATELETS 10*3/mm3 241 257     Results from last 7 days   Lab Units 05/07/24  0510 05/06/24  1407   SODIUM mmol/L 136 137   POTASSIUM mmol/L 4.4 4.4   CHLORIDE mmol/L 108* 104   CO2 mmol/L 24.0 21.5*   BUN mg/dL 15 17   CREATININE mg/dL 0.89 0.90   CALCIUM mg/dL 8.5* 8.7   Estimated Creatinine Clearance: 54.1 mL/min (by C-G formula based on SCr of 0.89 mg/dL).      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       PLAN  This is a pleasant 68-year-old lady with a history of brittle type 1 diabetes, hypertension and generalized anxiety who presents to the hospital after a fall.  She was out with her dog and had low blood sugar and lost her balance and fell forward in the parking lot onto her knee and is suffered a right patellar fracture and a right great toe fracture.  She was unable to bear weight or ambulate in the emergency room and was admitted to our service for therapy evaluations and orthopedic evaluation  -Will follow-up orthopedics recommendations regarding management of the patellar fracture and toe fracture.  In the meantime we will keep her  nonweightbearing on that side.  She is okay to stand and use a walker as able and physical therapy has been consulted.  -As far as her diabetes is concerned we will decrease her basal insulin to her home dose of 18 units.  I discussed her insulin regimen at home with regards to her sliding scale and scheduled mealtime insulins and will attempt to mimic this regimen here in the hospital.  Her diet has been adjusted to just diabetic diet.  -She does have some mild underlying anemia but no acute blood loss noted.  -Otherwise labs and vital signs remained stable  -Mechanical DVT prophylaxis  -Full code    Yulia Iniguez is physically incapable of accessing and or utilizing regular toilet facilities safely and independently within the home and will require a bedside commode. Patient is confined to a single room .     Yulia Iniguez will need to be discharged with a wheelchair from a [facility] due to her fractured great toe and patellar faracture.  Because of the previous mentioned issues, the patient has a mobility limitation that significantly impairs them to accomplish their MRADL entirely in the home. Mobility limitation cannot be resolved by using a cane or walker because she has shortness of pain and partial weightbearing on lower extremities. The patient's functional mobility deficit will be resolved with the use of a wheelchair. The patient is planning to use the wheelchair on a regular basis in the home and has not expressed an unwillingness to do so. The patient can safely use a manual wheelchair and has the strength to maneuver the wheelchair independently.       Disposition  Expected Discharge Date: 5/10/2024; Expected Discharge Time:        Ken Gonsalez MD  [facility]  05/07/24  10:54 EDT

## 2024-05-07 NOTE — CASE MANAGEMENT/SOCIAL WORK
Continued Stay Note  [facility]     Patient Name: Yulia Iniguez  MRN: [id number]  Today's Date: 5/7/2024    Admit Date: 5/6/2024    Plan: CCP following for needs.   YOLANDE Mota RN   Discharge Plan       Row Name 05/07/24 0929       Plan    Plan CCP following for needs.   YOLANDE Mota RN    Patient/Family in Agreement with Plan yes    Plan Comments FACE SHEET VERIFIED/ IM LETTER SIGNED.  Spoke with pt and pt's son (Jose J Gonzalez [phone]) at bedside.  Pt's PCP is JEN Hwang.  Pt lives alone in a first floor apartment.  Pt is independent with ADLs.   Pt has a Dexcom 6.  Pt gets her prescriptions at [facility].  Pt denies any issues affording medications. Pt is not current with .  Pt has not been in SNF.  CCP following for needs.  YOLANDE Mota RN                   Discharge Codes    No documentation.                 Expected Discharge Date and Time       Expected Discharge Date Expected Discharge Time    May 10, 2024               Cailin Mota RN

## 2024-05-07 NOTE — THERAPY EVALUATION
Patient Name: Yulia Iniguez  : 1955    MRN: [id number]                              Today's Date: 2024       Admit Date: 2024    Visit Dx:     ICD-10-CM ICD-9-CM   1. Closed nondisplaced transverse fracture of right patella, initial encounter  S82.034A 822.0   2. 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    Type 1 diabetes mellitus with hyperglycemia    Osteoporosis without current pathological fracture    Abnormal mammogram of left breast    Atypical ductal hyperplasia of left breast    Allergic rhinitis    Dyspnea on exertion    Right hand pain    Right patella fracture    Patella fracture     Past Medical History:   Diagnosis Date    Anemia     Anxiety     CRI (chronic renal insufficiency), stage 2 (mild)     PATIENT DENIES    Essential hypertension, benign     Former smoker     History of substance abuse     DOWNERS AGE 27    Hyperlipidemia     Osteopenia     actual problem not clear and no outside DXA info    Osteoporosis without current pathological fracture     Shortness of breath     PATIENT STATES DUE TO ANXIETY    Type 2 diabetes mellitus      Past Surgical History:   Procedure Laterality Date    BREAST BIOPSY  left    BREAST CYST ASPIRATION      BREAST LUMPECTOMY Left 2023    Procedure: left breast wire bracketed excisional biopsy;  Surgeon: Alanna Hamm MD;  [place] [facility] [place]  Service: General;  Laterality: Left;     SECTION      x2    COLONOSCOPY      normal    HAND SURGERY      trigger finger    HERNIA REPAIR      left side of abdomen    LAPAROSCOPIC TUBAL LIGATION      X2    TONSILLECTOMY      US GUIDED CYST ASPIRATION BREAST N/A 2022      General  Information       Row Name 05/07/24 1251          OT Time and Intention    Document Type evaluation  -AG     Mode of Treatment co-treatment;physical therapy;occupational therapy;other (see comments)  cotx for safe pt handling and mobility progression  -AG       Row Name 05/07/24 1251          General Information    Patient Profile Reviewed yes  -AG     Prior Level of Function independent:;ADL's  -AG     Existing Precautions/Restrictions fall;other (see comments)  RLE in knee immobilizer, R great toe fx  -AG     Barriers to Rehab none identified  -AG       Row Name 05/07/24 1251          Living Environment    People in Home alone  -AG       Row Name 05/07/24 1251          Home Main Entrance    Number of Stairs, Main Entrance none  -AG       Row Name 05/07/24 1251          Cognition    Orientation Status (Cognition) oriented x 4  -AG       Row Name 05/07/24 1251          Safety Issues, Functional Mobility    Safety Issues Affecting Function (Mobility) safety precaution awareness;sequencing abilities;positioning of assistive device  -AG     Impairments Affecting Function (Mobility) balance;endurance/activity tolerance;strength;pain  -AG               User Key  (r) = Recorded By, (t) = Taken By, (c) = Cosigned By      Initials Name Provider Type    AG Bhupendra Crane, BEATRIZ Occupational Therapist                     Mobility/ADL's       Row Name 05/07/24 1252          Bed Mobility    Bed Mobility scooting/bridging;supine-sit;sit-supine  -AG     Scooting/Bridging [place] (Bed Mobility) supervision  -AG     Supine-Sit [place] (Bed Mobility) standby assist  -AG     Sit-Supine [place] (Bed Mobility) standby assist  -AG     Assistive Device (Bed Mobility) bed rails;head of bed elevated  -     Comment, (Bed Mobility) excess time  -AG       Row Name 05/07/24 1252          Transfers    Transfers sit-stand transfer  -AG       Row Name 05/07/24 1252          Sit-Stand Transfer    Sit-Stand [place] (Transfers)  minimum assist (75% patient effort);1 person assist;verbal cues  -AG     Assistive Device (Sit-Stand Transfers) walker, front-wheeled  -AG     Comment, (Sit-Stand Transfer) 1x STS from EOB w cues for hand placement  -AG       Row Name 05/07/24 1252          Functional Mobility    Functional Mobility- Comment short distance in room w RW and CGA  -AG       [facility] Name 05/07/24 1252          Activities of Daily Living    BADL Assessment/Intervention lower body dressing;grooming;toileting  -AG       Row Name 05/07/24 1252          Lower Body Dressing Assessment/Training    [place] Level (Lower Body Dressing) lower body dressing skills;don;socks;maximum assist (25% patient effort)  -AG     Position (Lower Body Dressing) long sitting  -AG     Comment, (Lower Body Dressing) knee immobilizer  -AG       Row Name 05/07/24 1252          Grooming Assessment/Training    [place] Level (Grooming) grooming skills;wash face, hands;set up  -AG     Position (Grooming) edge of bed sitting  -AG       [facility] Name 05/07/24 1252          Toileting Assessment/Training    [place] Level (Toileting) dependent (less than 25% patient effort)  -AG     Comment, (Toileting) purewick suction - encouraged to use BSC to progress mobility/ADL independence  -AG               User Key  (r) = Recorded By, (t) = Taken By, (c) = Cosigned By      Initials Name Provider Type    AG Bhupendra Crane OT Occupational Therapist                   Obj/Interventions       [facility] Name 05/07/24 1254          Sensory Assessment (Somatosensory)    Sensory Assessment (Somatosensory) sensation intact  -[facility] Name 05/07/24 1254          Vision Assessment/Intervention    Visual Impairment/Limitations WFL  -[facility] Name 05/07/24 1254          Range of Motion Comprehensive    General Range of Motion no range of motion deficits identified  -[facility] Name 05/07/24 1254          Strength Comprehensive (MMT)    General Manual Muscle Testing (MMT) Assessment  no strength deficits identified  -AG       Row Name 05/07/24 1254          Motor Skills    Motor Skills functional endurance  -AG     Functional Endurance fair-  -AG       Row Name 05/07/24 1254          Balance    Balance Assessment sitting static balance;sitting dynamic balance;sit to stand dynamic balance;standing static balance;standing dynamic balance  -AG     Static Sitting Balance standby assist  -AG     Dynamic Sitting Balance standby assist  -AG     Position, Sitting Balance unsupported;sitting edge of bed  -AG     Sit to Stand Dynamic Balance minimal assist  -AG     Static Standing Balance contact guard  -AG     Dynamic Standing Balance contact guard  -AG     Position/Device Used, Standing Balance supported;walker, front-wheeled  -AG     Balance Interventions sitting;standing;sit to stand;supported;dynamic;static;minimal challenge;occupation based/functional task  -AG     Comment, Balance no LOB noted, heavy UE use in walking w RW  -AG               User Key  (r) = Recorded By, (t) = Taken By, (c) = Cosigned By      Initials Name Provider Type    AG Bhupendra Crane OT Occupational Therapist                   Goals/Plan       Row Name 05/07/24 1259          Bed Mobility Goal 1 (OT)    Activity/Assistive Device (Bed Mobility Goal 1, OT) bed mobility activities, all  -AG     [place] Level/Cues Needed (Bed Mobility Goal 1, OT) modified independence  -AG     Time Frame (Bed Mobility Goal 1, OT) short term goal (STG);2 weeks  -AG     Progress/Outcomes (Bed Mobility Goal 1, OT) goal ongoing  -AG       Row Name 05/07/24 1259          Transfer Goal 1 (OT)    Activity/Assistive Device (Transfer Goal 1, OT) transfers, all  -AG     Time Frame (Transfer Goal 1, OT) short term goal (STG);2 weeks  -AG     Progress/Outcome (Transfer Goal 1, OT) goal ongoing  -AG       Row Name 05/07/24 1259          Dressing Goal 1 (OT)    [place]/Cues Needed (Dressing Goal 1, OT) modified independence  -AG     Time Frame  (Dressing Goal 1, OT) short term goal (STG);2 weeks  -AG     Progress/Outcome (Dressing Goal 1, OT) goal ongoing  -AG       Row Name 05/07/24 1259          Toileting Goal 1 (OT)    Activity/Device (Toileting Goal 1, OT) toileting skills, all  -AG     [place] Level/Cues Needed (Toileting Goal 1, OT) modified independence  -AG     Time Frame (Toileting Goal 1, OT) short term goal (STG);2 weeks  -AG     Progress/Outcome (Toileting Goal 1, OT) goal ongoing  -       Row Name 05/07/24 1259          Grooming Goal 1 (OT)    Activity/Device (Grooming Goal 1, OT) grooming skills, all  -AG     [place] (Grooming Goal 1, OT) modified independence  -AG     Time Frame (Grooming Goal 1, OT) short term goal (STG);2 weeks  -AG     Progress/Outcome (Grooming Goal 1, OT) goal ongoing  -       Row Name 05/07/24 1256          Therapy Assessment/Plan (OT)    Planned Therapy Interventions (OT) activity tolerance training;functional balance retraining;occupation/activity based interventions;ROM/therapeutic exercise;IADL retraining;adaptive equipment training;BADL retraining;neuromuscular control/coordination retraining;patient/caregiver education/training;transfer/mobility retraining;strengthening exercise  -AG               User Key  (r) = Recorded By, (t) = Taken By, (c) = Cosigned By      Initials Name Provider Type    AG Bhupendra Crane, BEATRIZ Occupational Therapist                   Clinical Impression       Row Name 05/07/24 1255          Pain Assessment    Pretreatment Pain Rating 3/10  -AG     Posttreatment Pain Rating 3/10  -AG     Pre/Posttreatment Pain Comment pt. reports no pain in knee, 3/10 pain in great toe  -AG     Pain Intervention(s) Repositioned;Rest;Elevated  -AG       Row Name 05/07/24 1255          Plan of Care Review    Plan of Care Reviewed With patient  -AG     Progress no change  -AG     Outcome Evaluation 67 y/o F admitted to [facility] from fall post fall resulting in a R patellar and great toe  fractures. At time of evaluation, ortho reported use of knee immobilizer for RLE when OOB however no weight bearing restrictions. Prior to admission, pt. was independent, living alone in a first floor apartment with a bath tub and no AE use. Currently, pt. presents with decreased act darien, strength and pain limiting mobility and completion of ADLS. Pt. was SBA for bed mobility, min A for STS and CGA for func mob with heavy UE support and decreased ability to WB through the RLE d/t pain. Pt. was slow moving during func mob and will benefit from skilled OT services to address the above mentioned deficits, improve knowledge and use of DME recommendations and rec DC home with  OT/PT.  -AG       Row Name 05/07/24 1255          Therapy Assessment/Plan (OT)    Rehab Potential (OT) good, to achieve stated therapy goals  -AG     Criteria for Skilled Therapeutic Interventions Met (OT) yes;meets criteria;skilled treatment is necessary  -AG     Therapy Frequency (OT) 5 times/wk  -AG       Row Name 05/07/24 1255          Therapy Plan Review/Discharge Plan (OT)    Anticipated Discharge Disposition (OT) home with home health  -AG       Row Name 05/07/24 1255          Vital Signs    O2 Delivery Pre Treatment room air  -AG     Pre Patient Position Supine  -AG     Intra Patient Position Standing  -AG     Post Patient Position Supine  -AG       Row Name 05/07/24 1255          Positioning and Restraints    Pre-Treatment Position in bed  -AG     Post Treatment Position bed  -AG     In Bed notified nsg;encouraged to call for assist;exit alarm on;fowlers;call light within reach  -AG               User Key  (r) = Recorded By, (t) = Taken By, (c) = Cosigned By      Initials Name Provider Type    Bhupendra Pinto OT Occupational Therapist                   Outcome Measures       Row Name 05/07/24 1300          How much help from another is currently needed...    Putting on and taking off regular lower body clothing? 2  -AG     Bathing  (including washing, rinsing, and drying) 2  -AG     Toileting (which includes using toilet bed pan or urinal) 2  -AG     Putting on and taking off regular upper body clothing 3  -AG     Taking care of personal grooming (such as brushing teeth) 4  -AG     Eating meals 4  -AG     AM-PAC 6 Clicks Score (OT) 17  -AG       Row Name 05/07/24 1300          Functional Assessment    Outcome Measure Options AM-PAC 6 Clicks Daily Activity (OT)  -AG               User Key  (r) = Recorded By, (t) = Taken By, (c) = Cosigned By      Initials Name Provider Type    Bhupendra Pinto OT Occupational Therapist                    Occupational Therapy Education       Title: PT OT SLP Therapies (Done)       Topic: Occupational Therapy (Done)       Point: ADL training (Done)       Description:   Instruct learner(s) on proper safety adaptation and remediation techniques during self care or transfers.   Instruct in proper use of assistive devices.                  Learning Progress Summary             Patient Acceptance, E,TB, VU by  at 5/7/2024 1300                         Point: Home exercise program (Done)       Description:   Instruct learner(s) on appropriate technique for monitoring, assisting and/or progressing therapeutic exercises/activities.                  Learning Progress Summary             Patient Acceptance, E,TB, VU by  at 5/7/2024 1300                         Point: Precautions (Done)       Description:   Instruct learner(s) on prescribed precautions during self-care and functional transfers.                  Learning Progress Summary             Patient Acceptance, E,TB, VU by  at 5/7/2024 1300                         Point: Body mechanics (Done)       Description:   Instruct learner(s) on proper positioning and spine alignment during self-care, functional mobility activities and/or exercises.                  Learning Progress Summary             Patient Acceptance, E,TB, VU by  at 5/7/2024 1300                                          User Key       Initials Effective Dates Name Provider Type Discipline     01/23/24 -  Bhupendra Crane, BEATRIZ Occupational Therapist OT                  OT Recommendation and Plan  Planned Therapy Interventions (OT): activity tolerance training, functional balance retraining, occupation/activity based interventions, ROM/therapeutic exercise, IADL retraining, adaptive equipment training, BADL retraining, neuromuscular control/coordination retraining, patient/caregiver education/training, transfer/mobility retraining, strengthening exercise  Therapy Frequency (OT): 5 times/wk  Plan of Care Review  Plan of Care Reviewed With: patient  Progress: no change  Outcome Evaluation: 69 y/o F admitted to [facility] from fall post fall resulting in a R patellar and great toe fractures. At time of evaluation, ortho reported use of knee immobilizer for RLE when OOB however no weight bearing restrictions. Prior to admission, pt. was independent, living alone in a first floor apartment with a bath tub and no AE use. Currently, pt. presents with decreased act darien, strength and pain limiting mobility and completion of ADLS. Pt. was SBA for bed mobility, min A for STS and CGA for func mob with heavy UE support and decreased ability to WB through the RLE d/t pain. Pt. was slow moving during func mob and will benefit from skilled OT services to address the above mentioned deficits, improve knowledge and use of DME recommendations and rec DC home with HH OT/PT.     Time Calculation:   Evaluation Complexity (OT)  Review Occupational Profile/Medical/Therapy History Complexity: expanded/moderate complexity  Assessment, Occupational Performance/Identification of Deficit Complexity: 3-5 performance deficits  Clinical Decision Making Complexity (OT): detailed assessment/moderate complexity  Overall Complexity of Evaluation (OT): moderate complexity     Time Calculation- OT       Row Name 05/07/24 1301             Time  Calculation- OT    OT Start Time 1025  -AG      OT Stop Time 1052  -AG      OT Time Calculation (min) 27 min  -AG      Total Timed Code Minutes- OT 23 minute(s)  -AG      OT Received On 05/07/24  -AG      OT - Next Appointment 05/08/24  -AG      OT Goal Re-Cert Due Date 05/21/24  -AG         Timed Charges    10380 - OT Therapeutic Activity Minutes 8  -AG      28425 - OT Self Care/Mgmt Minutes 15  -AG         Untimed Charges    OT Eval/Re-eval Minutes 4  -AG         Total Minutes    Timed Charges Total Minutes 23  -AG      Untimed Charges Total Minutes 4  -AG       Total Minutes 27  -AG                User Key  (r) = Recorded By, (t) = Taken By, (c) = Cosigned By      Initials Name Provider Type     Bhupendra Crane OT Occupational Therapist                  Therapy Charges for Today       Code Description Service Date Service Provider Modifiers Qty    19908004122 HC OT THERAPEUTIC ACT EA 15 MIN 5/7/2024 Bhupendra Crane, OT GO 1    33259998524 HC OT SELF CARE/MGMT/TRAIN EA 15 MIN 5/7/2024 Bhupendra Crane OT GO 1    09312024277 HC OT EVAL MOD COMPLEXITY 3 5/7/2024 Bhupendra Crane OT GO 1                 Bhupendra Crane OT  5/7/2024

## 2024-05-07 NOTE — PLAN OF CARE
Goal Outcome Evaluation:  Plan of Care Reviewed With: patient        Progress: no change  Outcome Evaluation: 69 y/o F admitted to [facility] from fall post fall resulting in a R patellar and great toe fractures. At time of evaluation, ortho reported use of knee immobilizer for RLE when OOB however no weight bearing restrictions. Prior to admission, pt. was independent, living alone in a first floor apartment with a bath tub and no AE use. Currently, pt. presents with decreased act darien, strength and pain limiting mobility and completion of ADLS. Pt. was SBA for bed mobility, min A for STS and CGA for func mob with heavy UE support and decreased ability to WB through the RLE d/t pain. Pt. was slow moving during func mob and will benefit from skilled OT services to address the above mentioned deficits, improve knowledge and use of DME recommendations and rec DC home with HH OT/PT.      Anticipated Discharge Disposition (OT): home with home health

## 2024-05-07 NOTE — PLAN OF CARE
Goal Outcome Evaluation:  Plan of Care Reviewed With: patient           Outcome Evaluation: Pt is adm after a fall suffering a R patellar fracture and R great toe fracture, Per Dr Roblero, pt is to wear a KI and can bear weight as tolerated and follow up with orthopedics. Pt lives alone and was independent and active prior to admission. Pt has no stairs to maneuver at home. Pt did well today and able to use a walker for short distances, she was hesitant to bear weight due to great toe pain. She fatigued quickly and overall mobility was limited due to fatigue, heavy reliance with UE on walker. Pt plans home with walker, wheelchair, and BSC. PT will follow to maximize safety for home, rec home health PT      Anticipated Discharge Disposition (PT): home, home with home health

## 2024-05-07 NOTE — CASE MANAGEMENT/SOCIAL WORK
Discharge Planning Assessment  [facility]     Patient Name: Yulia Iniguez  MRN: [id number]  Today's Date: 5/7/2024    Admit Date: 5/6/2024    Plan: CCP following for needs.   YOLANDE Mota RN   Discharge Needs Assessment       Row Name 05/07/24 0928       Living Environment    People in Home alone    Current Living Arrangements apartment    Potentially Unsafe Housing Conditions none    Provides Primary Care For no one    Family Caregiver if Needed child(kemar), adult    Family Caregiver Names Son  ( Jose J Gonzalez [phone])    Quality of Family Relationships helpful;supportive    Able to Return to Prior Arrangements yes    Living Arrangement Comments Pt lives alone in a first floor apartment.       Resource/Environmental Concerns    Resource/Environmental Concerns none    Transportation Concerns none       Transition Planning    Patient/Family Anticipates Transition to home    Patient/Family Anticipated Services at Transition none    Transportation Anticipated family or friend will provide       Discharge Needs Assessment    Readmission Within the Last 30 Days no previous admission in last 30 days    Equipment Currently Used at Home glucometer    Concerns to be Addressed no discharge needs identified;denies needs/concerns at this time    Anticipated Changes Related to Illness none    Equipment Needed After Discharge glucometer                   Discharge Plan       Row Name 05/07/24 0929       Plan    Plan CCP following for needs.   YOLANDE Mota RN    Patient/Family in Agreement with Plan yes    Plan Comments FACE SHEET VERIFIED/ IM LETTER SIGNED.  Spoke with pt and pt's son (Jose J Gonzalez [phone]) at bedside.  Pt's PCP is JEN Hwang.  Pt lives alone in a first floor apartment.  Pt is independent with ADLs.   Pt has a Dexcom 6.  Pt gets her prescriptions at [facility].  Pt denies any issues affording medications. Pt is not current with .  Pt has not been in SNF.  CCP  following for needs.  YOLANDE Mota RN                  Continued Care and Services - Admitted Since 5/6/2024    No active coordination exists for this encounter.       Expected Discharge Date and Time       Expected Discharge Date Expected Discharge Time    May 10, 2024            Demographic Summary       Row Name 05/07/24 0927       General Information    Admission Type inpatient    Arrived From emergency department    Required Notices Provided Important Message from Medicare    Referral Source admission list    Reason for Consult discharge planning    Preferred Language English                   Functional Status       Row Name 05/07/24 0927       Functional Status    Usual Activity Tolerance good    Current Activity Tolerance good       Functional Status, IADL    Medications independent    Meal Preparation independent    Housekeeping independent    Laundry independent    Shopping independent       Mental Status    General Appearance WDL WDL                   Psychosocial    No documentation.                  Abuse/Neglect    No documentation.                  Legal    No documentation.                  Substance Abuse    No documentation.                  Patient Forms    No documentation.                     Cailin Mota, RN

## 2024-05-07 NOTE — CASE MANAGEMENT/SOCIAL WORK
Continued Stay Note  [facility]     Patient Name: Yulia Iniguez  MRN: [id number]  Today's Date: 5/7/2024    Admit Date: 5/6/2024    Plan: Plan home with HH.  YOLANDE Mota RN   Discharge Plan       Row Name 05/07/24 2958       Plan    Plan Plan home with HH.  YOLANDE Mota RN    Patient/Family in Agreement with Plan yes    Plan Comments Per MD pt will need BSC, walker and wheelchair for home use.  Spoke with pt at bedside. Pt preference for DMEs is [facility].  [facility]  ( [phone]) called to follow.  Pt provided HH list and requested [facility].  [facility]  ( [phone]) called to follow.  Plan home with HH.  YOLANDE Mota RN      Row Name 05/07/24 2028       Plan    Plan CCP following for needs.   YOLANDE Mota RN    Patient/Family in Agreement with Plan yes    Plan Comments FACE SHEET VERIFIED/ IM LETTER SIGNED.  Spoke with pt and pt's son (Jose J Gonzalez [phone]) at bedside.  Pt's PCP is JEN Hwang.  Pt lives alone in a first floor apartment.  Pt is independent with ADLs.   Pt has a Dexcom 6.  [facility]  Pt denies any issues affording medications. Pt is not current with HH.  Pt has not been in SNF.  CCP following for needs.  YOLANDE Mota RN                   Discharge Codes    No documentation.                 Expected Discharge Date and Time       Expected Discharge Date Expected Discharge Time    May 10, 2024               Cailin Mota RN

## 2024-05-08 ENCOUNTER — HOME HEALTH ADMISSION (OUTPATIENT)
Dept: HOME HEALTH SERVICES | Facility: HOME HEALTHCARE | Age: 69
End: 2024-05-08
Payer: MEDICARE

## 2024-05-08 ENCOUNTER — READMISSION MANAGEMENT (OUTPATIENT)
Dept: CALL CENTER | Facility: HOSPITAL | Age: 69
End: 2024-05-08
Payer: MEDICARE

## 2024-05-08 ENCOUNTER — TRANSCRIBE ORDERS (OUTPATIENT)
Dept: ADMINISTRATIVE | Facility: HOSPITAL | Age: 69
End: 2024-05-08
Payer: MEDICARE

## 2024-05-08 VITALS
SYSTOLIC BLOOD PRESSURE: 136 MMHG | OXYGEN SATURATION: 100 % | BODY MASS INDEX: 31.67 KG/M2 | RESPIRATION RATE: 18 BRPM | HEART RATE: 55 BPM | WEIGHT: 161.3 LBS | DIASTOLIC BLOOD PRESSURE: 71 MMHG | HEIGHT: 60 IN | TEMPERATURE: 97.9 F

## 2024-05-08 DIAGNOSIS — Z87.891 PERSONAL HISTORY OF TOBACCO USE, PRESENTING HAZARDS TO HEALTH: Primary | ICD-10-CM

## 2024-05-08 LAB
ALBUMIN SERPL-MCNC: 3.7 G/DL (ref 3.5–5.2)
ANION GAP SERPL CALCULATED.3IONS-SCNC: 10 MMOL/L (ref 5–15)
BASOPHILS # BLD AUTO: 0.04 10*3/MM3 (ref 0–0.2)
BASOPHILS NFR BLD AUTO: 0.7 % (ref 0–1.5)
BUN SERPL-MCNC: 17 MG/DL (ref 8–23)
BUN/CREAT SERPL: 18.9 (ref 7–25)
CALCIUM SPEC-SCNC: 8.4 MG/DL (ref 8.6–10.5)
CHLORIDE SERPL-SCNC: 103 MMOL/L (ref 98–107)
CO2 SERPL-SCNC: 22 MMOL/L (ref 22–29)
CREAT SERPL-MCNC: 0.9 MG/DL (ref 0.57–1)
DEPRECATED RDW RBC AUTO: 41.5 FL (ref 37–54)
EGFRCR SERPLBLD CKD-EPI 2021: 69.8 ML/MIN/1.73
EOSINOPHIL # BLD AUTO: 0.16 10*3/MM3 (ref 0–0.4)
EOSINOPHIL NFR BLD AUTO: 2.8 % (ref 0.3–6.2)
ERYTHROCYTE [DISTWIDTH] IN BLOOD BY AUTOMATED COUNT: 11.8 % (ref 12.3–15.4)
GLUCOSE BLDC GLUCOMTR-MCNC: 257 MG/DL (ref 70–130)
GLUCOSE BLDC GLUCOMTR-MCNC: 352 MG/DL (ref 70–130)
GLUCOSE SERPL-MCNC: 252 MG/DL (ref 65–99)
HCT VFR BLD AUTO: 34.2 % (ref 34–46.6)
HGB BLD-MCNC: 11.1 G/DL (ref 12–15.9)
IMM GRANULOCYTES # BLD AUTO: 0.02 10*3/MM3 (ref 0–0.05)
IMM GRANULOCYTES NFR BLD AUTO: 0.3 % (ref 0–0.5)
LYMPHOCYTES # BLD AUTO: 1.81 10*3/MM3 (ref 0.7–3.1)
LYMPHOCYTES NFR BLD AUTO: 31.4 % (ref 19.6–45.3)
MAGNESIUM SERPL-MCNC: 2.4 MG/DL (ref 1.6–2.4)
MCH RBC QN AUTO: 30.9 PG (ref 26.6–33)
MCHC RBC AUTO-ENTMCNC: 32.5 G/DL (ref 31.5–35.7)
MCV RBC AUTO: 95.3 FL (ref 79–97)
MONOCYTES # BLD AUTO: 0.64 10*3/MM3 (ref 0.1–0.9)
MONOCYTES NFR BLD AUTO: 11.1 % (ref 5–12)
NEUTROPHILS NFR BLD AUTO: 3.1 10*3/MM3 (ref 1.7–7)
NEUTROPHILS NFR BLD AUTO: 53.7 % (ref 42.7–76)
NRBC BLD AUTO-RTO: 0 /100 WBC (ref 0–0.2)
PHOSPHATE SERPL-MCNC: 2.8 MG/DL (ref 2.5–4.5)
PLATELET # BLD AUTO: 249 10*3/MM3 (ref 140–450)
PMV BLD AUTO: 10.9 FL (ref 6–12)
POTASSIUM SERPL-SCNC: 4.6 MMOL/L (ref 3.5–5.2)
RBC # BLD AUTO: 3.59 10*6/MM3 (ref 3.77–5.28)
SODIUM SERPL-SCNC: 135 MMOL/L (ref 136–145)
WBC NRBC COR # BLD AUTO: 5.77 10*3/MM3 (ref 3.4–10.8)

## 2024-05-08 PROCEDURE — 83735 ASSAY OF MAGNESIUM: CPT | Performed by: HOSPITALIST

## 2024-05-08 PROCEDURE — 82948 REAGENT STRIP/BLOOD GLUCOSE: CPT

## 2024-05-08 PROCEDURE — G0378 HOSPITAL OBSERVATION PER HR: HCPCS

## 2024-05-08 PROCEDURE — 85025 COMPLETE CBC W/AUTO DIFF WBC: CPT | Performed by: HOSPITALIST

## 2024-05-08 PROCEDURE — 97110 THERAPEUTIC EXERCISES: CPT

## 2024-05-08 PROCEDURE — 63710000001 INSULIN GLARGINE PER 5 UNITS: Performed by: HOSPITALIST

## 2024-05-08 PROCEDURE — 63710000001 INSULIN LISPRO (HUMAN) PER 5 UNITS: Performed by: HOSPITALIST

## 2024-05-08 PROCEDURE — 80069 RENAL FUNCTION PANEL: CPT | Performed by: HOSPITALIST

## 2024-05-08 RX ORDER — INSULIN LISPRO 100 [IU]/ML
7 INJECTION, SOLUTION INTRAVENOUS; SUBCUTANEOUS ONCE
Status: COMPLETED | OUTPATIENT
Start: 2024-05-08 | End: 2024-05-08

## 2024-05-08 RX ORDER — INSULIN GLARGINE 100 [IU]/ML
18 INJECTION, SOLUTION SUBCUTANEOUS DAILY
Start: 2024-05-08

## 2024-05-08 RX ORDER — HYDROCODONE BITARTRATE AND ACETAMINOPHEN 5; 325 MG/1; MG/1
1 TABLET ORAL EVERY 4 HOURS PRN
Qty: 15 TABLET | Refills: 0 | Status: SHIPPED | OUTPATIENT
Start: 2024-05-08 | End: 2024-05-11

## 2024-05-08 RX ORDER — INSULIN LISPRO 100 [IU]/ML
6 INJECTION, SOLUTION INTRAVENOUS; SUBCUTANEOUS ONCE
Status: COMPLETED | OUTPATIENT
Start: 2024-05-08 | End: 2024-05-08

## 2024-05-08 RX ADMIN — LISINOPRIL 40 MG: 40 TABLET ORAL at 08:09

## 2024-05-08 RX ADMIN — ATORVASTATIN CALCIUM 10 MG: 20 TABLET, FILM COATED ORAL at 08:08

## 2024-05-08 RX ADMIN — INSULIN GLARGINE 18 UNITS: 100 INJECTION, SOLUTION SUBCUTANEOUS at 06:20

## 2024-05-08 RX ADMIN — Medication 1 TABLET: at 08:08

## 2024-05-08 RX ADMIN — Medication 50 MG: at 08:09

## 2024-05-08 RX ADMIN — ASPIRIN 81 MG: 81 TABLET, COATED ORAL at 08:08

## 2024-05-08 RX ADMIN — INSULIN LISPRO 7 UNITS: 100 INJECTION, SOLUTION INTRAVENOUS; SUBCUTANEOUS at 08:09

## 2024-05-08 RX ADMIN — HYDROCHLOROTHIAZIDE 25 MG: 25 TABLET ORAL at 08:09

## 2024-05-08 RX ADMIN — INSULIN LISPRO 6 UNITS: 100 INJECTION, SOLUTION INTRAVENOUS; SUBCUTANEOUS at 10:48

## 2024-05-08 RX ADMIN — Medication 5000 UNITS: at 08:09

## 2024-05-08 NOTE — THERAPY TREATMENT NOTE
Patient Name: Yulia Iniguez  : 1955    MRN: [id number]                              Today's Date: 2024       Admit Date: 2024    Visit Dx:     ICD-10-CM ICD-9-CM   1. Closed nondisplaced transverse fracture of right patella, initial encounter  S82.034A 822.0   2. Nondisplaced unspecified fracture of right great toe, initial encounter for closed fracture  S92.404A 826.0   3. Type 1 diabetes mellitus with hyperglycemia  E10.65 250.01   4. 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    Type 1 diabetes mellitus with hyperglycemia    Osteoporosis without current pathological fracture    Abnormal mammogram of left breast    Atypical ductal hyperplasia of left breast    Allergic rhinitis    Dyspnea on exertion    Right hand pain    Right patella fracture    Patella fracture     Past Medical History:   Diagnosis Date    Anemia     Anxiety     CRI (chronic renal insufficiency), stage 2 (mild)     PATIENT DENIES    Essential hypertension, benign     Former smoker     History of substance abuse     DOWNERS AGE 27    Hyperlipidemia     Osteopenia     actual problem not clear and no outside DXA info    Osteoporosis without current pathological fracture     Shortness of breath     PATIENT STATES DUE TO ANXIETY    Type 2 diabetes mellitus      Past Surgical History:   Procedure Laterality Date    BREAST BIOPSY  left    BREAST CYST ASPIRATION      BREAST LUMPECTOMY Left 2023    Procedure: left breast wire bracketed excisional biopsy;  Surgeon: Alanna Hamm MD;  Location: [facility] [place] [facility] [place];  Service: General;  Laterality: Left;     SECTION      x2    COLONOSCOPY      normal    HAND SURGERY      trigger finger     HERNIA REPAIR      left side of abdomen    LAPAROSCOPIC TUBAL LIGATION      X2    TONSILLECTOMY      US GUIDED CYST ASPIRATION BREAST N/A 09/06/2022      General Information       Row Name 05/08/24 1442          Physical Therapy Time and Intention    Document Type therapy note (daily note)  -PC     Mode of Treatment physical therapy  -PC       Row Name 05/08/24 1442          General Information    Existing Precautions/Restrictions brace on at all times  KI on at all times, use leg , use post op shoe when ambulating  -       Row Name 05/08/24 1442          Cognition    Orientation Status (Cognition) oriented x 4  -PC               User Key  (r) = Recorded By, (t) = Taken By, (c) = Cosigned By      Initials Name Provider Type     Anne Mackenzie PT Physical Therapist                   Mobility       Row Name 05/08/24 1458          Bed Mobility    Scooting/Bridging Coos (Bed Mobility) supervision  -PC     Supine-Sit [place] (Bed Mobility) supervision  -PC     Sit-Supine [place]os (Bed Mobility) supervision  -       Row Name 05/08/24 1458          Sit-Stand Transfer    Sit-Stand [place] (Transfers) supervision  -PC     Assistive Device (Sit-Stand Transfers) walker, front-wheeled  -PC       Row Name 05/08/24 1458          Gait/Stairs (Locomotion)    Coos Level (Gait) supervision  -PC     Assistive Device (Gait) walker, front-wheeled  -PC     Distance in Feet (Gait) 20  -PC     Deviations/Abnormal Patterns (Gait) antalgic  -PC     Comment, (Gait/Stairs) KI and post op shoe  -PC               User Key  (r) = Recorded By, (t) = Taken By, (c) = Cosigned By      Initials Name Provider Type     Anne Mackenzie PT Physical Therapist                   Obj/Interventions       Row Name 05/08/24 1458          Motor Skills    Therapeutic Exercise --  pt educated on how to lift leg using straps on brace as a leg , and how to adjust brace properly  -PC               User Key  (r) =  Recorded By, (t) = Taken By, (c) = Cosigned By      Initials Name Provider Type    PC Anne Mackenzie, PT Physical Therapist                   Goals/Plan    No documentation.                  Clinical Impression       Row Name 05/08/24 1459          Pain    Pre/Posttreatment Pain Comment pt did not rate pain  -PC       Row Name 05/08/24 1459          Plan of Care Review    Plan of Care Reviewed With patient  -PC     Progress improving  -PC     Outcome Evaluation Pt was able to get out of bed and walk in room with a walker and supervision level of assist, KI and post op shoe on, pt educated on how to use brace strap as a leg  and proper fitting of KI. Pt plans discharge home today.  -PC       Row Name 05/08/24 1459          Positioning and Restraints    Pre-Treatment Position in bed  -PC     Post Treatment Position bed  -PC     In Bed supine;call light within reach;encouraged to call for assist  -PC               User Key  (r) = Recorded By, (t) = Taken By, (c) = Cosigned By      Initials Name Provider Type    PC Anne Mackenzie, PT Physical Therapist                   Outcome Measures       Row Name 05/08/24 1501 05/08/24 0805       How much help from another person do you currently need...    Turning from your back to your side while in flat bed without using bedrails? 4  -PC 4  -BR    Moving from lying on back to sitting on the side of a flat bed without bedrails? 4  -PC 4  -BR    Moving to and from a bed to a chair (including a wheelchair)? 4  -PC 3  -BR    Standing up from a chair using your arms (e.g., wheelchair, bedside chair)? 4  -PC 3  -BR    Climbing 3-5 steps with a railing? 3  -PC 2  -BR    To walk in hospital room? 4  -PC 3  -BR    AM-PAC 6 Clicks Score (PT) 23  -PC 19  -BR    Highest Level of Mobility Goal 7 --> Walk 25 feet or more  -PC 6 --> Walk 10 steps or more  -BR              User Key  (r) = Recorded By, (t) = Taken By, (c) = Cosigned By      Initials Name Provider Type    Anne Floyd  JESSICA, PT Physical Therapist    Gilbert Zavala, RN Registered Nurse                                 Physical Therapy Education       Title: PT OT SLP Therapies (Resolved)       Topic: Physical Therapy (Resolved)       Point: Mobility training (Resolved)       Learning Progress Summary             Patient Acceptance, E,TB, VU by SS at 5/8/2024 0359    Acceptance, E,D, DU by PC at 5/7/2024 1308                         Point: Home exercise program (Resolved)       Learner Progress:  Not documented in this visit.              Point: Body mechanics (Resolved)       Learning Progress Summary             Patient Acceptance, E,TB, VU by SS at 5/8/2024 0359    Acceptance, E,D, DU by PC at 5/7/2024 1308                         Point: Precautions (Resolved)       Learning Progress Summary             Patient Acceptance, E,TB, VU by SS at 5/8/2024 0359    Acceptance, E,D, DU by PC at 5/7/2024 1308                                         User Key       Initials Effective Dates Name Provider Type Discipline     06/16/21 -  Anne Mackenzie PT Physical Therapist PT     02/03/22 -  Vega Rudolph RN Registered Nurse Nurse                  PT Recommendation and Plan  Planned Therapy Interventions (PT): gait training, transfer training, strengthening  Plan of Care Reviewed With: patient  Progress: improving  Outcome Evaluation: Pt was able to get out of bed and walk in room with a walker and supervision level of assist, KI and post op shoe on, pt educated on how to use brace strap as a leg  and proper fitting of KI. Pt plans discharge home today.     Time Calculation:         PT Charges       Row Name 05/08/24 1503             Time Calculation    Start Time 1308  -PC      Stop Time 1325  -PC      Time Calculation (min) 17 min  -PC      PT Received On 05/08/24  -PC      PT - Next Appointment 05/09/24  -PC                User Key  (r) = Recorded By, (t) = Taken By, (c) = Cosigned By      Initials Name Provider Type    THOMAS Mackenzie  Anne LOPEZ, PT Physical Therapist                  Therapy Charges for Today       Code Description Service Date Service Provider Modifiers Qty    36429468696 HC PT EVAL MOD COMPLEXITY 3 5/7/2024 Anne Mackenzie, PT GP 1    62582534406 HC PT THER PROC EA 15 MIN 5/7/2024 Anne Mackenzie, PT GP 1    17075414564 HC GAIT TRAINING EA 15 MIN 5/7/2024 Anne Mackenzie, PT GP 1    12348261149 HC PT THER PROC EA 15 MIN 5/8/2024 Anne Mackenzie, PT GP 1            PT G-Codes  Outcome Measure Options: AM-PAC 6 Clicks Daily Activity (OT)  AM-PAC 6 Clicks Score (PT): 23  AM-PAC 6 Clicks Score (OT): 17  PT Discharge Summary  Anticipated Discharge Disposition (PT): home with assist, home with home health    Anne Mackenzie, PT  5/8/2024

## 2024-05-08 NOTE — PLAN OF CARE
Goal Outcome Evaluation:      Pt alert and oriented. VSS. On RA. SB on tele. No acute distress noted. Plan of care ongoing.

## 2024-05-08 NOTE — PLAN OF CARE
Goal Outcome Evaluation:  Plan of Care Reviewed With: patient        Progress: improving  Outcome Evaluation: Pt was able to get out of bed and walk in room with a walker and supervision level of assist, KI and post op shoe on, pt educated on how to use brace strap as a leg  and proper fitting of KI. Pt plans discharge home today.      Anticipated Discharge Disposition (PT): home with assist, home with home health

## 2024-05-08 NOTE — CASE MANAGEMENT/SOCIAL WORK
Continued Stay Note  [facility]     Patient Name: Yulia Iniguez  MRN: [id number]  Today's Date: 5/8/2024    Admit Date: 5/6/2024    Plan: Plan home.   YOLANDE Mota RN   Discharge Plan       Row Name 05/08/24 1255       Plan    Plan Plan home.   YOLANDE Mota RN    Patient/Family in Agreement with Plan yes    Plan Comments Spoke with Iva ([phone]) with [facility] who states pt plans to return to work so will not qualify for HH.  Plan home.  YOLANDE Mota RN      Row Name 05/08/24 1229       Plan    Plan Plan home with [facility].  YOLANDE Mota RN    Patient/Family in Agreement with Plan yes    Plan Comments Iva  ([phone]) with [facility] following pt and have accepted pt.  Gian ( [phone]) called to deliver pt's DME.  Pt states she will purchase a walker.  BSC and W/C to be delivered to pt's room. Pt declined BSC. .   Plan home with [facility].  YOLANDE Mota RN                   Discharge Codes    No documentation.                 Expected Discharge Date and Time       Expected Discharge Date Expected Discharge Time    May 8, 2024               Cailin Mota RN

## 2024-05-08 NOTE — CASE MANAGEMENT/SOCIAL WORK
Continued Stay Note  [facility]     Patient Name: Yulia Iniguez  MRN: [id number]  Today's Date: 5/8/2024    Admit Date: 5/6/2024    Plan: Plan home with [facility].  YOLANDE Mota RN   Discharge Plan       Row Name 05/08/24 1229       Plan    Plan Plan home with [facility].  YOLANDE Mota RN    Patient/Family in Agreement with Plan yes    Plan Comments Iva  ([phone]) with [facility] following pt and have accepted pt.  Gian ( [phone]) called to deliver pt's DME.  Pt states she will purchase a walker.  BSC and W/C to be delivered to pt's room. Pt declined BSC. .   Plan home with [facility].  YOLANDE Mota RN                   Discharge Codes    No documentation.                 Expected Discharge Date and Time       Expected Discharge Date Expected Discharge Time    May 8, 2024               Cailin Mota RN

## 2024-05-08 NOTE — CASE MANAGEMENT/SOCIAL WORK
Case Management Discharge Note      Final Note: Ptt discharged home.  Gianna RN         Selected Continued Care - Admitted Since 5/6/2024       Destination    No services have been selected for the patient.                Durable Medical Equipment       Service Provider Selected Services Address Phone Fax Patient Preferred    [facility] [address] [address] [phone] --              Dialysis/Infusion    No services have been selected for the patient.                Home Medical Care    No services have been selected for the patient.                Therapy    No services have been selected for the patient.                Community Resources    No services have been selected for the patient.                Community & DME    No services have been selected for the patient.                    Transportation Services  Private: Car    Final Discharge Disposition Code: 01 - home or self-care
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
## 2024-05-09 ENCOUNTER — TRANSITIONAL CARE MANAGEMENT TELEPHONE ENCOUNTER (OUTPATIENT)
Dept: CALL CENTER | Facility: HOSPITAL | Age: 69
End: 2024-05-09
Payer: MEDICARE

## 2024-07-11 ENCOUNTER — TELEPHONE (OUTPATIENT)
Dept: FAMILY MEDICINE CLINIC | Facility: CLINIC | Age: 69
End: 2024-07-11
Payer: MEDICARE

## 2024-07-11 DIAGNOSIS — E10.65 UNCONTROLLED TYPE 1 DIABETES MELLITUS WITH HYPERGLYCEMIA: ICD-10-CM

## 2024-07-11 RX ORDER — BLOOD SUGAR DIAGNOSTIC
STRIP MISCELLANEOUS
Qty: 500 EACH | Refills: 4 | Status: SHIPPED | OUTPATIENT
Start: 2024-07-11

## 2024-07-11 NOTE — TELEPHONE ENCOUNTER
Incoming Refill Request      Medication requested (name and dose): ACCUCHECK GUIDE TEST STRIPS     Pharmacy where request should be sent: [facility]   [address]    Additional details provided by patient:     Best call back number:     Does the patient have less than a 3 day supply:  [] Yes  [] No    Carmina De Dios Rep  07/11/24, 10:16 EDT

## 2024-07-11 NOTE — TELEPHONE ENCOUNTER
Relay    Left message on VM to see if she still needs the chest xray ordered in Jan 2024 by JEN De,

## 2024-07-11 NOTE — TELEPHONE ENCOUNTER
Rx Refill Note  Requested Prescriptions     Pending Prescriptions Disp Refills    glucose blood (Accu-Chek Guide) test strip 500 each 4     Sig: Check 5 times a day on insulin tx, poor control, hypoglycemia. Dx: E 10.65      Last office visit with prescribing clinician: 11/13/2023   Last telemedicine visit with prescribing clinician: Visit date not found   Next office visit with prescribing clinician: 7/19/2024                         Would you like a call back once the refill request has been completed: [] Yes [] No    If the office needs to give you a call back, can they leave a voicemail: [] Yes [] No    Emma Alicea  07/11/24, 10:20 EDT

## 2024-07-19 ENCOUNTER — OFFICE VISIT (OUTPATIENT)
Dept: ENDOCRINOLOGY | Age: 69
End: 2024-07-19
Payer: MEDICARE

## 2024-07-19 DIAGNOSIS — E10.65 TYPE 1 DIABETES MELLITUS WITH HYPERGLYCEMIA: Primary | ICD-10-CM

## 2024-07-19 PROCEDURE — 3078F DIAST BP <80 MM HG: CPT | Performed by: NURSE PRACTITIONER

## 2024-07-19 PROCEDURE — 95251 CONT GLUC MNTR ANALYSIS I&R: CPT | Performed by: NURSE PRACTITIONER

## 2024-07-19 PROCEDURE — 3051F HG A1C>EQUAL 7.0%<8.0%: CPT | Performed by: NURSE PRACTITIONER

## 2024-07-19 PROCEDURE — 3074F SYST BP LT 130 MM HG: CPT | Performed by: NURSE PRACTITIONER

## 2024-07-19 PROCEDURE — 1159F MED LIST DOCD IN RCRD: CPT | Performed by: NURSE PRACTITIONER

## 2024-07-19 PROCEDURE — 99214 OFFICE O/P EST MOD 30 MIN: CPT | Performed by: NURSE PRACTITIONER

## 2024-07-19 PROCEDURE — 1160F RVW MEDS BY RX/DR IN RCRD: CPT | Performed by: NURSE PRACTITIONER

## 2024-07-19 NOTE — Clinical Note
Please arrange labs in2 weeks and also MA visit to download her cgm device- so labs and cgm download on the same day around the same time See me in 3m

## 2024-07-24 VITALS
TEMPERATURE: 96.1 F | WEIGHT: 142 LBS | BODY MASS INDEX: 27.73 KG/M2 | OXYGEN SATURATION: 98 % | SYSTOLIC BLOOD PRESSURE: 118 MMHG | DIASTOLIC BLOOD PRESSURE: 64 MMHG
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
## 2024-07-26 ENCOUNTER — OFFICE VISIT (OUTPATIENT)
Dept: FAMILY MEDICINE CLINIC | Facility: CLINIC | Age: 69
End: 2024-07-26
Payer: MEDICARE

## 2024-07-26 VITALS
BODY MASS INDEX: 29.17 KG/M2 | SYSTOLIC BLOOD PRESSURE: 118 MMHG | DIASTOLIC BLOOD PRESSURE: 70 MMHG | TEMPERATURE: 98.2 F | HEART RATE: 62 BPM | HEIGHT: 60 IN | WEIGHT: 148.6 LBS | RESPIRATION RATE: 18 BRPM | OXYGEN SATURATION: 99 %

## 2024-07-26 DIAGNOSIS — R06.09 DYSPNEA ON EXERTION: ICD-10-CM

## 2024-07-26 DIAGNOSIS — D64.9 ANEMIA, UNSPECIFIED TYPE: ICD-10-CM

## 2024-07-26 DIAGNOSIS — S82.001D CLOSED NONDISPLACED FRACTURE OF RIGHT PATELLA WITH ROUTINE HEALING, UNSPECIFIED FRACTURE MORPHOLOGY, SUBSEQUENT ENCOUNTER: ICD-10-CM

## 2024-07-26 DIAGNOSIS — E78.00 HYPERCHOLESTEROLEMIA: ICD-10-CM

## 2024-07-26 DIAGNOSIS — M81.0 OSTEOPOROSIS WITHOUT CURRENT PATHOLOGICAL FRACTURE, UNSPECIFIED OSTEOPOROSIS TYPE: ICD-10-CM

## 2024-07-26 DIAGNOSIS — M79.641 RIGHT HAND PAIN: ICD-10-CM

## 2024-07-26 DIAGNOSIS — J30.9 ALLERGIC RHINITIS, UNSPECIFIED SEASONALITY, UNSPECIFIED TRIGGER: ICD-10-CM

## 2024-07-26 DIAGNOSIS — I10 PRIMARY HYPERTENSION: Primary | ICD-10-CM

## 2024-07-26 DIAGNOSIS — E10.29 TYPE 1 DIABETES MELLITUS WITH OTHER KIDNEY COMPLICATION: ICD-10-CM

## 2024-07-26 PROBLEM — S82.009A PATELLA FRACTURE: Status: RESOLVED | Noted: 2024-05-07 | Resolved: 2024-07-26

## 2024-07-26 PROBLEM — M25.561 ARTHRALGIA OF RIGHT KNEE: Status: ACTIVE | Noted: 2024-06-04

## 2024-07-26 PROCEDURE — 3051F HG A1C>EQUAL 7.0%<8.0%: CPT | Performed by: NURSE PRACTITIONER

## 2024-07-26 PROCEDURE — 1160F RVW MEDS BY RX/DR IN RCRD: CPT | Performed by: NURSE PRACTITIONER

## 2024-07-26 PROCEDURE — 3074F SYST BP LT 130 MM HG: CPT | Performed by: NURSE PRACTITIONER

## 2024-07-26 PROCEDURE — 3078F DIAST BP <80 MM HG: CPT | Performed by: NURSE PRACTITIONER

## 2024-07-26 PROCEDURE — G2211 COMPLEX E/M VISIT ADD ON: HCPCS | Performed by: NURSE PRACTITIONER

## 2024-07-26 PROCEDURE — 99214 OFFICE O/P EST MOD 30 MIN: CPT | Performed by: NURSE PRACTITIONER

## 2024-07-26 PROCEDURE — 1159F MED LIST DOCD IN RCRD: CPT | Performed by: NURSE PRACTITIONER

## 2024-07-26 PROCEDURE — 1125F AMNT PAIN NOTED PAIN PRSNT: CPT | Performed by: NURSE PRACTITIONER

## 2024-07-26 NOTE — PROGRESS NOTES
Subjective   Yulia Iniguez is a 68 y.o. female.     Chief Complaint   Patient presents with    Shortness of Breath    Knee Injury     R knee     Hypertension       History of Present Illness   Patient presents for follow up dyspnea: pt saw cardiology and had negative stress test and Echo WNL; symptoms have recently improved since moved out of son's house; thinks symptoms were related to anxiety; has only had 2 episodes since moved--one when fractured knee and other when worried about money; no more problems breathing when going up steps; former smoker, stopped smoking 13+ years ago, previously smoked 1.5-2 packs per day for about 30 years; saw pulmo and had PFTs and WNL; pulmo also ordered CT chest, needs to schedule.    Had recent fracture of right patella in early May and admitted to hospital; had diabetic episode when fell; saw Dr Roblero, ortho; has been doing PT weekly; will get aching in right knee after being on feet a lot at work; wears brace and helps but is too big, needs something smaller to stabilize knee; will get discomfort only at times; does not wear brace due to too large; needs to schedule follow up with ortho.    F/U right hand pain: saw hand surgery and gave cortisone injection and symptoms much better; to follow up as needed; no more cramping.     F/U DM1: takes Lantus daily and Humalog with meals; sees layla LI; last A1c 7.7%; has been monitoring sugar with Dexcom monitor; blood sugars have been running 80-120s; has had some low blood sugars at night; endo decreased Lantus to 10 units, but pt has only decreased to 15 units; watches carbs/sugars in diet; not much exercise recently; on feet much of day at work; has been doing some resistance bands; no numbness or tingling; last eye exam at [facility] March 2023, needs to schedule, has cataract monitoring; saw podiatry Ke/Shalom and had feet checked.     F/U HTN: takes Lisinopril and HCTZ daily; does not monitor BP; no  headache for most part; drinking plenty of water helps prevent headaches; no orthostasis; no swelling other than some in right leg after being on feet with recent knee fracture.     F/U Hyperlipidemia: takes Atorvastatin daily; no myalgias; watches saturated fats in diet; fasting today.     F/U osteoporosis: has been taking Fosamax weekly more consistently; no problems with medication.     F/U ADH of left breast: had lumpectomy, no cancer; sees Dr. Hamm; has upcoming follow up and repeat breast MRI in October.      The following portions of the patient's history were reviewed and updated as appropriate: allergies, current medications, past family history, past medical history, past social history, past surgical history and problem list.    Current Outpatient Medications on File Prior to Visit   Medication Sig    Accu-Chek Softclix Lancets lancets Check 5 times a day on insulin tx, poor control, hypoglycemia. Dx: E 10.65    albuterol sulfate  (90 Base) MCG/ACT inhaler Inhale 2 puffs Every 6 (Six) Hours As Needed for Shortness of Air.    alendronate (FOSAMAX) 70 MG tablet TAKE 1 TABLET EVERY 7 DAYS WITH LARGE GLASS OF WATER 30 MIN BEFORE FIRST FOOD, DRINK, MEDS; AVOID LYING DOWN FOR 30 MIN    aspirin 81 MG EC tablet Take 1 tablet by mouth Daily.    atorvastatin (LIPITOR) 10 MG tablet Take 1 tablet by mouth Daily.    Blood Glucose Monitoring Suppl (Accu-Chek Guide) w/Device kit     Continuous Blood Gluc Sensor (Dexcom G6 Sensor) Dipsense Dexcom G6 Sensors, to replace every 10 days, 3 sensors per 30 day period (NDC #[address]-65). Check 6 times a day. Dx: E 10.65    Continuous Blood Gluc Transmit (Dexcom G6 Transmitter) misc 1 each Every 3 (Three) Months. Dispense 1 Dexcom G6 Transmitter for each 3 month period (NDC #[address]-43). Check 6 times a day. Dx: E 10.65    glucose blood (Accu-Chek Guide) test strip Check 5 times a day on insulin tx, poor control, hypoglycemia. Dx: E 10.65    hydroCHLOROthiazide 25  "MG tablet Take 1 tablet by mouth Daily.    Insulin Aspart (novoLOG) 100 UNIT/ML injection Use as directed for meal & correction coverage up to 35 units a day    insulin glargine (Lantus) 100 UNIT/ML injection Inject 18 Units under the skin into the appropriate area as directed Daily. (Patient taking differently: Inject 15 Units under the skin into the appropriate area as directed Daily.)    Insulin Syringe-Needle U-100 (BD Veo Insulin Syringe U/F) 31G X 15/64\" 0.3 ML misc For use to give insulin from vial up to 5 times a day. Can substitute based on availability and insurance.    lisinopril (PRINIVIL,ZESTRIL) 40 MG tablet Take 1 tablet by mouth Daily.    multivitamin with minerals tablet tablet Take 1 tablet by mouth Daily. HOLD ONE WEEK FOR SURGERY    Pyridoxine HCl (VITAMIN B6 PO) Take 1 tablet by mouth Daily.    vitamin B-12 (CYANOCOBALAMIN) 100 MCG tablet Take  by mouth Daily.    VITAMIN D, CHOLECALCIFEROL, PO Take 1 tablet by mouth Daily.     No current facility-administered medications on file prior to visit.        Past Medical History:   Diagnosis Date    Anemia     Anxiety     CRI (chronic renal insufficiency), stage 2 (mild)     PATIENT DENIES    Essential hypertension, benign     Former smoker     History of substance abuse     DOWNERS AGE 27    Hyperlipidemia     Osteopenia     actual problem not clear and no outside DXA info    Osteoporosis without current pathological fracture     Shortness of breath     PATIENT STATES DUE TO ANXIETY    Type 2 diabetes mellitus        Past Surgical History:   Procedure Laterality Date    BREAST BIOPSY  left    BREAST CYST ASPIRATION      BREAST LUMPECTOMY Left 2023    Procedure: left breast wire bracketed excisional biopsy;  Surgeon: Alanna Hamm MD;  Location: [facility];  Service: General;  Laterality: Left;     SECTION      x2    COLONOSCOPY      normal    HAND SURGERY      trigger finger    HERNIA REPAIR      left side of abdomen    " LAPAROSCOPIC TUBAL LIGATION      X2    TONSILLECTOMY      US GUIDED CYST ASPIRATION BREAST N/A 2022       Family History   Problem Relation Age of Onset    Thyroid disease Mother         weight gain thyroid    No Known Problems Father     Thyroid disease Sister         graves disease    ADD / ADHD Son     Malig Hyperthermia Neg Hx        Social History     Socioeconomic History    Marital status:    Tobacco Use    Smoking status: Former     Current packs/day: 0.00     Types: Cigarettes     Start date: 1970     Quit date: 10/3/2012     Years since quittin.8    Smokeless tobacco: Never    Tobacco comments:     previously smoked about 2 packs per day for about  35 years; did not smoke during pregnancies     Caffeine 2 Cups/day    Vaping Use    Vaping status: Never Used   Substance and Sexual Activity    Alcohol use: Yes     Comment: social    Drug use: Never     Comment: [employer]IJUANA IN HIGH SCHOOL AND DOWNERS AGE 27    Sexual activity: Not Currently     Birth control/protection: Tubal ligation       Review of Systems   Constitutional:  Negative for appetite change, chills, fatigue, fever, unexpected weight gain and unexpected weight loss.   HENT:  Positive for rhinorrhea (some recently). Negative for ear pain, sore throat and trouble swallowing. Sinus pressure: mild recently.   Eyes:  Negative for blurred vision.   Respiratory:  Negative for cough, chest tightness and shortness of breath.    Cardiovascular:  Negative for chest pain and palpitations.   Gastrointestinal:  Negative for abdominal pain, blood in stool, constipation, diarrhea, GERD and indigestion.   Endocrine: Negative for polydipsia.   Genitourinary:  Negative for dysuria and frequency.   Skin:  Negative for rash.   Neurological:  Negative for syncope and weakness.       Objective   Vitals:    24 0745   BP: 118/70   BP Location: Left arm   Patient Position: Sitting   Cuff Size: Small Adult   Pulse: 62   Resp: 18   Temp: 98.2 °F  "(36.8 °C)   TempSrc: Temporal   SpO2: 99%   Weight: 67.4 kg (148 lb 9.6 oz)   Height: 152.4 cm (60\")   PainSc:   6   PainLoc: Knee  Comment: r     Body mass index is 29.02 kg/m².    Physical Exam  Vitals and nursing note reviewed.   Constitutional:       General: She is not in acute distress.     Appearance: She is well-developed and well-groomed. She is not diaphoretic.   HENT:      Head: Normocephalic and atraumatic.      Right Ear: External ear normal. No decreased hearing noted. Right ear middle ear effusion: mild. Tympanic membrane is not erythematous.      Left Ear: External ear normal. No decreased hearing noted. Left ear middle ear effusion: mild. Tympanic membrane is not erythematous.      Nose: Nose normal.      Right Sinus: No maxillary sinus tenderness or frontal sinus tenderness.      Left Sinus: No maxillary sinus tenderness or frontal sinus tenderness.      Mouth/Throat:      Mouth: Mucous membranes are moist.      Pharynx: No oropharyngeal exudate or posterior oropharyngeal erythema.   Eyes:      Conjunctiva/sclera: Conjunctivae normal.   Neck:      Vascular: No carotid bruit.   Cardiovascular:      Rate and Rhythm: Normal rate and regular rhythm.      Pulses: Normal pulses.           Dorsalis pedis pulses are 2+ on the right side and 2+ on the left side.        Posterior tibial pulses are 1+ on the right side and 1+ on the left side.      Heart sounds: Normal heart sounds. No murmur heard.  Pulmonary:      Effort: Pulmonary effort is normal. No respiratory distress.      Breath sounds: Normal breath sounds.   Abdominal:      General: Bowel sounds are normal.      Palpations: Abdomen is soft. There is no hepatomegaly or splenomegaly.      Tenderness: There is no abdominal tenderness. There is no guarding.   Musculoskeletal:         General: Normal range of motion.      Cervical back: Normal range of motion and neck supple.      Right knee: Crepitus present. No bony tenderness. Normal range of motion. "      Instability Tests: Anterior drawer test negative. Posterior drawer test negative.      Left knee: No crepitus.      Right lower leg: No edema.      Left lower leg: No edema.   Feet:      Right foot:      Protective Sensation: 10 sites tested.  10 sites sensed.      Skin integrity: Skin integrity normal.      Left foot:      Protective Sensation: 10 sites tested.  10 sites sensed.      Skin integrity: Skin integrity normal.      Comments: Diabetic Foot Exam Performed and Monofilament Test Performed    Lymphadenopathy:      Cervical: No cervical adenopathy.   Skin:     General: Skin is warm and dry.      Findings: No rash.   Neurological:      Mental Status: She is alert and oriented to person, place, and time.      Gait: Gait normal.   Psychiatric:         Mood and Affect: Mood normal.         Behavior: Behavior normal.         Thought Content: Thought content normal.         Cognition and Memory: Cognition normal.         Judgment: Judgment normal.         Lab Results   Component Value Date    WBC 5.77 05/08/2024    RBC 3.59 (L) 05/08/2024    HGB 11.1 (L) 05/08/2024    HCT 34.2 05/08/2024    MCV 95.3 05/08/2024    MCH 30.9 05/08/2024    MCHC 32.5 05/08/2024    RDW 11.8 (L) 05/08/2024    RDWSD 41.5 05/08/2024    MPV 10.9 05/08/2024     05/08/2024    NEUTRORELPCT 53.7 05/08/2024    LYMPHORELPCT 31.4 05/08/2024    MONORELPCT 11.1 05/08/2024    EOSRELPCT 2.8 05/08/2024    BASORELPCT 0.7 05/08/2024    AUTOIGPER 0.3 05/08/2024    NEUTROABS 3.10 05/08/2024    LYMPHSABS 1.81 05/08/2024    MONOSABS 0.64 05/08/2024    EOSABS 0.16 05/08/2024    BASOSABS 0.04 05/08/2024    AUTOIGNUM 0.02 05/08/2024    NRBC 0.0 05/08/2024     Lab Results   Component Value Date    GLUCOSE 252 (H) 05/08/2024    BUN 17 05/08/2024    CREATININE 0.90 05/08/2024    EGFRIFNONA 62 12/10/2021    EGFRIFAFRI 71 12/10/2021    BCR 18.9 05/08/2024    K 4.6 05/08/2024    CO2 22.0 05/08/2024    CALCIUM 8.4 (L) 05/08/2024    PROTENTOTREF 6.5  01/12/2024    ALBUMIN 3.7 05/08/2024    LABIL2 2.3 01/12/2024    AST 15 05/07/2024    ALT 10 05/07/2024      Lab Results   Component Value Date    CHLPL 211 (H) 01/12/2024    TRIG 41 01/12/2024     (H) 01/12/2024    VLDL 8 01/12/2024    LDL 65 01/12/2024     Lab Results   Component Value Date    TSH 2.210 10/10/2022     Lab Results   Component Value Date    HGBA1C 7.70 (H) 05/07/2024         Assessment    Problem List Items Addressed This Visit       Hypercholesterolemia    Current Assessment & Plan     Continue Atorvastatin daily.  Continue to work on healthy diet and exercise as tolerated.         Relevant Orders    Comprehensive Metabolic Panel    Lipid Panel With LDL / HDL Ratio    Primary hypertension - Primary    Current Assessment & Plan     Hypertension is stable and controlled  Continue current treatment regimen.  Regular aerobic exercise.  Ambulatory blood pressure monitoring.  Blood pressure will be reassessed  5 months .  Continue Lisinopril and HCTZ daily.         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   Continue current treatment regimen.  Regular aerobic exercise.  Ophthalmology/Optometry referral.  Diabetes will be reassessed  5 months  Continue Lantus daily and Humalog with meals.  Follow-up as scheduled with endocrine.         Relevant Orders    Comprehensive Metabolic Panel    Lipid Panel With LDL / HDL Ratio    Vitamin B12 & Folate    Osteoporosis without current pathological fracture    Current Assessment & Plan     Continue Fosamax weekly.         Allergic rhinitis    Current Assessment & Plan     Try taking antihistamine, such as Claritin or Allegra.         RESOLVED: Dyspnea on exertion    RESOLVED: Right hand pain    Right patella fracture    Current Assessment & Plan      Continue physical therapy.  Follow-up as scheduled with ortho.             Return in about 4 months (around 11/26/2024) for Medicare Wellness, Recheck. or sooner if problems or concerns.

## 2024-07-26 NOTE — PROGRESS NOTES
" Subjective   The ABCs of the Annual Wellness Visit  Medicare Wellness Visit      Yulia Iniguez is a 68 y.o. patient who presents for a Medicare Wellness Visit.    The following portions of the patient's history were reviewed and   updated as appropriate: {history reviewed:20406::\"allergies\",\"current medications\",\"past family history\",\"past medical history\",\"past social history\",\"past surgical history\",\"problem list\"}.    Compared to one year ago, the patient's physical   health is {better worse same:61898}.  Compared to one year ago, the patient's mental   health is {better worse same:25828}.    Recent Hospitalizations:  This patient has had a [facility] admission record on file within the last 365 days.  Current Medical Providers:  Patient Care Team:  Nelli Grijalva APRN as PCP - General (Family Medicine)  Elizabeth Doyle APRN as Nurse Practitioner (Behavioral Health)  Josh Clifton OD (Optometry)  Maribel Zelaya APRN as Nurse Practitioner (Endocrinology)    Outpatient Medications Prior to Visit   Medication Sig Dispense Refill    Accu-Chek Softclix Lancets lancets Check 5 times a day on insulin tx, poor control, hypoglycemia. Dx: E 10.65 500 each 4    albuterol sulfate  (90 Base) MCG/ACT inhaler Inhale 2 puffs Every 6 (Six) Hours As Needed for Shortness of Air. 18 g 0    alendronate (FOSAMAX) 70 MG tablet TAKE 1 TABLET EVERY 7 DAYS WITH LARGE GLASS OF WATER 30 MIN BEFORE FIRST FOOD, DRINK, MEDS; AVOID LYING DOWN FOR 30 MIN 12 tablet 3    aspirin 81 MG EC tablet Take 1 tablet by mouth Daily.      atorvastatin (LIPITOR) 10 MG tablet Take 1 tablet by mouth Daily. 90 tablet 3    Blood Glucose Monitoring Suppl (Accu-Chek Guide) w/Device kit       Continuous Blood Gluc Sensor (Dexcom G6 Sensor) Dipsense Dexcom G6 Sensors, to replace every 10 days, 3 sensors per 30 day period (NDC #[address]-87). Check 6 times a day. Dx: E 10.65 9 each 4    Continuous Blood Gluc Transmit (Dexcom G6 Transmitter) " "misc 1 each Every 3 (Three) Months. Dispense 1 Dexcom G6 Transmitter for each 3 month period (NDC #[address]-14). Check 6 times a day. Dx: E 10.65 1 each 4    glucose blood (Accu-Chek Guide) test strip Check 5 times a day on insulin tx, poor control, hypoglycemia. Dx: E 10.65 500 each 4    hydroCHLOROthiazide 25 MG tablet Take 1 tablet by mouth Daily. 90 tablet 3    Insulin Aspart (novoLOG) 100 UNIT/ML injection Use as directed for meal & correction coverage up to 35 units a day 31.5 mL 1    insulin glargine (Lantus) 100 UNIT/ML injection Inject 18 Units under the skin into the appropriate area as directed Daily.      Insulin Syringe-Needle U-100 (BD Veo Insulin Syringe U/F) 31G X 15/64\" 0.3 ML misc For use to give insulin from vial up to 5 times a day. Can substitute based on availability and insurance. 500 each 4    lisinopril (PRINIVIL,ZESTRIL) 40 MG tablet Take 1 tablet by mouth Daily. 90 tablet 3    multivitamin with minerals tablet tablet Take 1 tablet by mouth Daily. HOLD ONE WEEK FOR SURGERY      Pyridoxine HCl (VITAMIN B6 PO) Take 1 tablet by mouth Daily.      vitamin B-12 (CYANOCOBALAMIN) 100 MCG tablet Take  by mouth Daily.      VITAMIN D, CHOLECALCIFEROL, PO Take 1 tablet by mouth Daily.       No facility-administered medications prior to visit.     No opioid medication identified on active medication list. I have reviewed chart for other potential  high risk medication/s and harmful drug interactions in the elderly.      Aspirin is on active medication list. {ASPIRIN ON MEDICATION LIST INDICATED/NOT INDICATED:82503}.      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  {ACP Discussion, Advance Directive not in EMR:09839}        Objective   There were no vitals filed for this visit.    Estimated body mass index is 27.73 kg/m² as calculated from the following:    Height as of 24: 152.4 cm (60\").    Weight as of 24: 64.4 kg (142 lb).             Does the patient have evidence of cognitive impairment? {Yes/No:79643}  Lab Results   Component Value Date    HGBA1C 7.70 (H) 2024                                                                                                Health  Risk Assessment    Smoking Status:  Social History     Tobacco Use   Smoking Status Former    Current packs/day: 0.00    Types: Cigarettes    Start date: 1970    Quit date: 10/3/2012    Years since quittin.8   Smokeless Tobacco Never   Tobacco Comments    previously smoked about 2 packs per day for about  35 years; did not smoke during pregnancies    Caffeine 2 Cups/day      Alcohol Consumption:  Social History     Substance and Sexual Activity   Alcohol Use Yes    Comment: social     Fall Risk Screen:  STEADI Fall Risk Assessment has not been completed.    Depression Screenin/31/2023     9:24 AM   PHQ-2/PHQ-9 Depression Screening   Little Interest or Pleasure in Doing Things 0-->not at all   Feeling Down, Depressed or Hopeless 0-->not at all   PHQ-9: Brief Depression Severity Measure Score 0     Health Habits and Functional and Cognitive Screenin/31/2023     9:00 AM   Functional & Cognitive Status   Do you have difficulty preparing food and eating? No   Do you have difficulty bathing yourself, getting dressed or grooming yourself? No   Do you have difficulty using the toilet? No   Do you have difficulty moving around from place " to place? No   Do you have trouble with steps or getting out of a bed or a chair? No   Current Diet Well Balanced Diet   Dental Exam Up to date   Eye Exam Up to date   Exercise (times per week) 0 times per week   Current Exercises Include No Regular Exercise   Do you need help using the phone?  No   Are you deaf or do you have serious difficulty hearing?  No   Do you need help to go to places out of walking distance? No   Do you need help shopping? No   Do you need help preparing meals?  No   Do you need help with housework?  No   Do you need help with laundry? No   Do you need help taking your medications? No   Do you need help managing money? No   Do you ever drive or ride in a car without wearing a seat belt? No   Have you felt unusual stress, anger or loneliness in the last month? No   Who do you live with? Child   If you need help, do you have trouble finding someone available to you? No   Have you been bothered in the last four weeks by sexual problems? No   Do you have difficulty concentrating, remembering or making decisions? No             Age-appropriate Screening Schedule:  Refer to the list below for future screening recommendations based on patient's age, sex and/or medical conditions. Orders for these recommended tests are listed in the plan section. The patient has been provided with a written plan.    Health Maintenance List  Health Maintenance   Topic Date Due    COVID-19 Vaccine (1 - 2023-24 season) Never done    DIABETIC EYE EXAM  03/29/2024    ANNUAL WELLNESS VISIT  07/31/2024    Pneumococcal Vaccine 65+ (1 of 2 - PCV) 07/31/2024 (Originally 10/3/1961)    ZOSTER VACCINE (1 of 2) 07/31/2024 (Originally 10/3/2005)    DIABETIC FOOT EXAM  07/31/2024    BMI FOLLOWUP  07/31/2024    INFLUENZA VACCINE  08/01/2024    HEMOGLOBIN A1C  11/07/2024    LIPID PANEL  01/12/2025    URINE MICROALBUMIN  01/12/2025    DXA SCAN  08/24/2025    MAMMOGRAM  03/15/2026    COLORECTAL CANCER SCREENING  06/11/2026    TDAP/TD  VACCINES (2 - Td or Tdap) 05/06/2034    HEPATITIS C SCREENING  Completed                                                                                                                                                CMS Preventative Services Quick Reference  Risk Factors Identified During Encounter  {Medicare Wellness Risk Factors:40339}    The above risks/problems have been discussed with the patient.  Pertinent information has been shared with the patient in the After Visit Summary.  An After Visit Summary and PPPS were made available to the patient.    Follow Up:{Wrapup  Review (Popup)  Advance Care Planning  Labs  CC  Problem List  Visit Diagnosis  Medications  Result Review  Imaging  [facility]  Quality  BestPractice  SmartSets  SnapShot  Encounters  Notes  Media  Procedures :23}   Next Medicare Wellness visit to be scheduled in 1 year.         Additional E&M Note during same encounter follows:  Patient has additional, significant, and separately identifiable condition(s)/problem(s) that require work above and beyond the Medicare Wellness Visit     Chief Complaint  No chief complaint on file.    Subjective {Problem List  Visit Diagnosis   Encounters  Notes  Medications  Labs  Result Review Imaging  Media :23}{Help Text;  If performing a Preventative Medicine Visit (e.g. 07005) in addition to AWV, choose the 1st SmartList option below and document any ROS/PE performed.  If performing a separately identifiable E/M service (e.g. 61486), choose 2nd SmartLink option below. This information in red will not appear in the final note after note is signed:66494}  HPI  {AWV/Preventative Exam/EM Progress Note. Use this note if billing for additional Wellness Visit or EM exam in addition to AWV visit (Optional):[id number]}                   Objective   Vital Signs:  There were no vitals taken for this visit.  Physical Exam    {The following data was reviewed by (Optional):34780}         Assessment and Plan {CC Problem List  Visit Diagnosis   ROS  Review (Popup)  Health Maintenance  Quality  BestPractice  Medications  SmartSets  SnapShot Encounters  Media :23}{Help Text; When performing an adult Preventative Medicine Visit (e.g. 72045) using the optional SmartList below can help when documenting any age-appropriate advice given.  When using the SmartList review and update the information based on the unique discussion or advice given to the patient.  As a reminder, diagnosis code Z00.00 (nl exam) or Z00.01 (abnl exam), should be added when this service is performed.  Text will disappear once the note is signed:32050}{Preventative Physical Additional Health Advice List (Optional):[id number]}                No orders of the defined types were placed in this encounter.          {Time Spent (Optional):85973}  Follow Up {Instructions Charge Capture  Follow-up Communications :23}  No follow-ups on file.  Patient was given instructions and counseling regarding her condition or for health maintenance advice. Please see specific information pulled into the AVS if appropriate.

## 2024-07-26 NOTE — ASSESSMENT & PLAN NOTE
Hypertension is stable and controlled  Continue current treatment regimen.  Regular aerobic exercise.  Ambulatory blood pressure monitoring.  Blood pressure will be reassessed  5 months .  Continue Lisinopril and HCTZ daily.

## 2024-07-26 NOTE — PATIENT INSTRUCTIONS
Call Mandaen scheduling at [phone] to schedule CT chest.  Try taking antihistamine, such as Claritin or Allegra.  Continue to monitor your blood sugar once daily before a meal and record results.  Continue to work on healthy diet and exercise as tolerated.  Follow up pending lab results.  Follow up in 4 months for Medicare Wellness, or sooner if problems or concerns.   Schedule a follow up appointment with ortho.

## 2024-07-26 NOTE — ASSESSMENT & PLAN NOTE
Diabetes is stable.   Continue current treatment regimen.  Regular aerobic exercise.  Ophthalmology/Optometry referral.  Diabetes will be reassessed  5 months  Continue Lantus daily and Humalog with meals.  Follow-up as scheduled with endocrine.

## 2024-07-27 LAB
ALBUMIN SERPL-MCNC: 4.4 G/DL (ref 3.5–5.2)
ALBUMIN/GLOB SERPL: 2.2 G/DL
ALP SERPL-CCNC: 57 U/L (ref 39–117)
ALT SERPL-CCNC: 10 U/L (ref 1–33)
AST SERPL-CCNC: 19 U/L (ref 1–32)
BASOPHILS # BLD AUTO: 0.04 10*3/MM3 (ref 0–0.2)
BASOPHILS NFR BLD AUTO: 0.8 % (ref 0–1.5)
BILIRUB SERPL-MCNC: 0.4 MG/DL (ref 0–1.2)
BUN SERPL-MCNC: 17 MG/DL (ref 8–23)
BUN/CREAT SERPL: 16.2 (ref 7–25)
CALCIUM SERPL-MCNC: 9.8 MG/DL (ref 8.6–10.5)
CHLORIDE SERPL-SCNC: 101 MMOL/L (ref 98–107)
CHOLEST SERPL-MCNC: 199 MG/DL (ref 0–200)
CO2 SERPL-SCNC: 25.8 MMOL/L (ref 22–29)
CREAT SERPL-MCNC: 1.05 MG/DL (ref 0.57–1)
EGFRCR SERPLBLD CKD-EPI 2021: 58 ML/MIN/1.73
EOSINOPHIL # BLD AUTO: 0.14 10*3/MM3 (ref 0–0.4)
EOSINOPHIL NFR BLD AUTO: 2.7 % (ref 0.3–6.2)
ERYTHROCYTE [DISTWIDTH] IN BLOOD BY AUTOMATED COUNT: 11.9 % (ref 12.3–15.4)
FERRITIN SERPL-MCNC: 108 NG/ML (ref 13–150)
FOLATE SERPL-MCNC: >20 NG/ML (ref 4.78–24.2)
GLOBULIN SER CALC-MCNC: 2 GM/DL
GLUCOSE SERPL-MCNC: 34 MG/DL (ref 65–99)
HCT VFR BLD AUTO: 32.2 % (ref 34–46.6)
HDLC SERPL-MCNC: 129 MG/DL (ref 40–60)
HGB BLD-MCNC: 11.6 G/DL (ref 12–15.9)
IMM GRANULOCYTES # BLD AUTO: 0 10*3/MM3 (ref 0–0.05)
IMM GRANULOCYTES NFR BLD AUTO: 0 % (ref 0–0.5)
IRON SERPL-MCNC: 64 MCG/DL (ref 37–145)
LDLC SERPL CALC-MCNC: 62 MG/DL (ref 0–100)
LDLC/HDLC SERPL: 0.48 {RATIO}
LYMPHOCYTES # BLD AUTO: 2.25 10*3/MM3 (ref 0.7–3.1)
LYMPHOCYTES NFR BLD AUTO: 42.8 % (ref 19.6–45.3)
MCH RBC QN AUTO: 35.3 PG (ref 26.6–33)
MCHC RBC AUTO-ENTMCNC: 36 G/DL (ref 31.5–35.7)
MCV RBC AUTO: 97.9 FL (ref 79–97)
MONOCYTES # BLD AUTO: 0.67 10*3/MM3 (ref 0.1–0.9)
MONOCYTES NFR BLD AUTO: 12.7 % (ref 5–12)
NEUTROPHILS # BLD AUTO: 2.16 10*3/MM3 (ref 1.7–7)
NEUTROPHILS NFR BLD AUTO: 41 % (ref 42.7–76)
NRBC BLD AUTO-RTO: 0 /100 WBC (ref 0–0.2)
PLATELET # BLD AUTO: 253 10*3/MM3 (ref 140–450)
POTASSIUM SERPL-SCNC: 5.2 MMOL/L (ref 3.5–5.2)
PROT SERPL-MCNC: 6.4 G/DL (ref 6–8.5)
RBC # BLD AUTO: 3.29 10*6/MM3 (ref 3.77–5.28)
SODIUM SERPL-SCNC: 137 MMOL/L (ref 136–145)
TRIGL SERPL-MCNC: 38 MG/DL (ref 0–150)
VIT B12 SERPL-MCNC: >2000 PG/ML (ref 211–946)
VLDLC SERPL CALC-MCNC: 8 MG/DL (ref 5–40)
WBC # BLD AUTO: 5.26 10*3/MM3 (ref 3.4–10.8)

## 2024-07-30 RX ORDER — LANOLIN ALCOHOL/MO/W.PET/CERES
1000 CREAM (GRAM) TOPICAL EVERY OTHER DAY
Start: 2024-07-30

## 2024-08-13 DIAGNOSIS — E10.65 TYPE 1 DIABETES MELLITUS WITH HYPERGLYCEMIA: Primary | ICD-10-CM

## 2024-08-14 ENCOUNTER — CLINICAL SUPPORT (OUTPATIENT)
Dept: ENDOCRINOLOGY | Age: 69
End: 2024-08-14
Payer: MEDICARE

## 2024-08-14 ENCOUNTER — TELEPHONE (OUTPATIENT)
Dept: ENDOCRINOLOGY | Age: 69
End: 2024-08-14
Payer: MEDICARE

## 2024-08-14 DIAGNOSIS — E10.65 TYPE 1 DIABETES MELLITUS WITH HYPERGLYCEMIA: ICD-10-CM

## 2024-08-14 DIAGNOSIS — E10.29 TYPE 1 DIABETES MELLITUS WITH OTHER KIDNEY COMPLICATION: Primary | ICD-10-CM

## 2024-08-14 NOTE — PROGRESS NOTES
CGM Follow-up and Printout    8/14/2024    Patient: Yulia Iniguez  YOB: 1955    Provider: JEN Agrawal    Device Name: Dexcom G6 - This was patient provided equipment.    Yulia Iniguez returns today for assistance with their CGM device and for data collection as ordered by JEN Agrawal.      CGM report was downloaded at today's visit and will be uploaded to the patient's electronic medical record for provider review and interpretation.      Patient denies any questions or concerns at this time. Patient reports she is comfortable using the device and will reach out if any questions arise.     Next Scheduled Follow-Up: 10/25/2024     Daniella Alicea MA  8/14/2024  12:56 EDT

## 2024-08-14 NOTE — Clinical Note
Can you Notify patient of cgm review- she was not happy with the way james gave her the lab results so I do not want james to call her with more results

## 2024-08-14 NOTE — TELEPHONE ENCOUNTER
Patient has an appointment with Maribel today but is very upset. She stated when she got a call about her lab results, whoever she spoke to told her that she needs to go see a kidney specialist and not to take ibuprofen, when the patient does not take ibuprofen. Patient stated she asked if she was in danger and whoever she spoke to stated that her levels were high but she just needs to go see a specialist. Patient wants to know who she spoke to and an answer when she comes in at 1pm for her appointment

## 2024-08-14 NOTE — PROGRESS NOTES
Cgm review 8/1/24-8/14/24  Avg glu 194  GMI 8%  5% low  43% time in range  28% high  24% very high     A few low blood sugars noted around 6a-8a- is this before breakfast or is she eating around this time? Or is she sleeping and laying on the device- confirm low blood sugar readings with fingerstick- if she is having true low blood sugars around this time and eating sugar to bring it back up, this may be the cause of the blood sugars being so high starting around 9a  BS run the highest between 9a-3p    Continue lantus at 10u daily  Would recommend to continue 1u per 10g of carbs at lunch and dinner but to change breakfast time to 1u per 8g/carbs  Change sliding scale to 1u for every 30/150

## 2024-08-14 NOTE — PROGRESS NOTES
Yes ma'am of course I spoke with pt and she stated that yes the lows are before breakfast, is she confirming with a finger poke and it is always within 10-15 points. I did also inform her of the medication changes but she stated that she is doing 15 units of the lantus. Should she keep doing 15 units?

## 2024-08-14 NOTE — TELEPHONE ENCOUNTER
I spoke to patient to clarify the clinical decisions but she is very very upset with james and how the results were given to her. I told her you may call her just to understand how we can help prevent this in the future with other patients

## 2024-08-15 RX ORDER — INSULIN GLARGINE 100 [IU]/ML
18 INJECTION, SOLUTION SUBCUTANEOUS DAILY
Qty: 15 ML | Refills: 0 | Status: SHIPPED | OUTPATIENT
Start: 2024-08-15

## 2024-08-15 NOTE — PROGRESS NOTES
08/15/2024 Called and left pt a detailed message that we sent that rx to [facility], Advised to return call if she would like it to go to a different pharmacy.

## 2024-08-15 NOTE — PROGRESS NOTES
08/15/2024 Called and informed pt, she voiced understanding and stated that she will need a refill of her lantus.

## 2024-08-23 ENCOUNTER — HOSPITAL ENCOUNTER (OUTPATIENT)
Dept: PET IMAGING | Facility: HOSPITAL | Age: 69
Discharge: HOME | End: 2024-08-23
Payer: MEDICARE

## 2024-08-23 DIAGNOSIS — Z87.891 PERSONAL HISTORY OF TOBACCO USE, PRESENTING HAZARDS TO HEALTH: ICD-10-CM

## 2024-08-23 PROCEDURE — 71271 CT THORAX LUNG CANCER SCR C-: CPT

## 2024-10-25 ENCOUNTER — OFFICE VISIT (OUTPATIENT)
Dept: ENDOCRINOLOGY | Age: 69
End: 2024-10-25
Payer: MEDICARE

## 2024-10-25 VITALS
WEIGHT: 142.6 LBS | SYSTOLIC BLOOD PRESSURE: 126 MMHG | TEMPERATURE: 98 F | HEIGHT: 60 IN | HEART RATE: 54 BPM | OXYGEN SATURATION: 98 % | BODY MASS INDEX: 28 KG/M2 | DIASTOLIC BLOOD PRESSURE: 78 MMHG

## 2024-10-25 DIAGNOSIS — E10.65 TYPE 1 DIABETES MELLITUS WITH HYPERGLYCEMIA: Primary | ICD-10-CM

## 2024-10-25 PROCEDURE — 3051F HG A1C>EQUAL 7.0%<8.0%: CPT | Performed by: NURSE PRACTITIONER

## 2024-10-25 PROCEDURE — 99214 OFFICE O/P EST MOD 30 MIN: CPT | Performed by: NURSE PRACTITIONER

## 2024-10-25 PROCEDURE — 95251 CONT GLUC MNTR ANALYSIS I&R: CPT | Performed by: NURSE PRACTITIONER

## 2024-10-25 PROCEDURE — 3078F DIAST BP <80 MM HG: CPT | Performed by: NURSE PRACTITIONER

## 2024-10-25 PROCEDURE — 3074F SYST BP LT 130 MM HG: CPT | Performed by: NURSE PRACTITIONER

## 2024-10-25 RX ORDER — INSULIN ASPART 100 [IU]/ML
INJECTION, SOLUTION INTRAVENOUS; SUBCUTANEOUS
Qty: 45 ML | Refills: 1 | Status: SHIPPED | OUTPATIENT
Start: 2024-10-25

## 2024-10-25 RX ORDER — SYRING-NEEDL,DISP,INSUL,0.3 ML 31GX15/64"
SYRINGE, EMPTY DISPOSABLE MISCELLANEOUS
Qty: 500 EACH | Refills: 4 | Status: SHIPPED | OUTPATIENT
Start: 2024-10-25

## 2024-10-25 RX ORDER — INSULIN GLARGINE 100 [IU]/ML
15 INJECTION, SOLUTION SUBCUTANEOUS DAILY
Qty: 30 ML | Refills: 0 | Status: SHIPPED | OUTPATIENT
Start: 2024-10-25

## 2024-10-25 NOTE — PROGRESS NOTES
"Chief Complaint  Diabetes    Subjective        Yulia Iniguez presents to [facility] ENDOCRINOLOGY  History of Present Illness    7/2024:Decrease lantus to 10u daily  Change novolog to 1u/10g of carbs and 1u 50/150- to improve insulin dose around meal times and with hyperglycemia     Type 1 dm, 1982   DM regimen: lantus 15u QAM, novolog 1u:10g/carbs   Does not want insulin delivery device as she wants control and \"I'm not a normal diabetic\", feels she can't use a pump since she is a \"brittle diabetic\"  Could not see renal r/t cost, planning to go at the end of the year    Last eye exam: UTD 2024  Glucagon: has them but doesn't find the need since she lives alone, no family or friends to check in on her   Renal: lisinopril 40mg QD  CV: atorvastatin 10mg QD     Cgm review 10/12/24-10/25/24  Average glucose 171  Gmi 7.4%  11% low- 12a-9a  48% time in range  21% high   20% very high     Objective   Vital Signs:  /78   Pulse 54   Temp 98 °F (36.7 °C) (Oral)   Ht 152.4 cm (60\")   Wt 64.7 kg (142 lb 9.6 oz)   SpO2 98%   BMI 27.85 kg/m²   Estimated body mass index is 27.85 kg/m² as calculated from the following:    Height as of this encounter: 152.4 cm (60\").    Weight as of this encounter: 64.7 kg (142 lb 9.6 oz).          Physical Exam  Vitals reviewed.   Constitutional:       General: She is not in acute distress.  HENT:      Head: Normocephalic and atraumatic.   Cardiovascular:      Rate and Rhythm: Normal rate and regular rhythm.   Pulmonary:      Effort: Pulmonary effort is normal. No respiratory distress.   Musculoskeletal:         General: No signs of injury. Normal range of motion.      Cervical back: Normal range of motion and neck supple.   Skin:     General: Skin is warm and dry.   Neurological:      Mental Status: She is alert and oriented to person, place, and time. Mental status is at baseline.   Psychiatric:         Mood and Affect: Mood normal.         Behavior: Behavior " "normal.         Thought Content: Thought content normal.         Judgment: Judgment normal.        Result Review :  The following data was reviewed by: JEN Agrawal on 10/25/2024:  Common labs          5/8/2024    05:47 7/26/2024    08:39 8/9/2024    08:36   Common Labs   Glucose 252  34  89    BUN 17  17  13    Creatinine 0.90  1.05  1.06    Sodium 135  137  138    Potassium 4.6  5.2  5.5    Chloride 103  101  101    Calcium 8.4  9.8  9.7    Total Protein  6.4     Albumin 3.7  4.4     Total Bilirubin  0.4     Alkaline Phosphatase  57     AST (SGOT)  19     ALT (SGPT)  10     WBC 5.77  5.26     Hemoglobin 11.1  11.6     Hematocrit 34.2  32.2     Platelets 249  253     Total Cholesterol  199     Triglycerides  38     HDL Cholesterol  129     LDL Cholesterol   62     Hemoglobin A1C   7.60                Assessment and Plan   Diagnoses and all orders for this visit:    1. Type 1 diabetes mellitus with hyperglycemia (Primary)  -     Lipid Panel  -     Hemoglobin A1c  -     Comprehensive Metabolic Panel  -     Microalbumin / Creatinine Urine Ratio - Urine, Clean Catch  -     Insulin Aspart (novoLOG) 100 UNIT/ML injection; Use as directed for meal & correction coverage up to 35 units a day  Dispense: 45 mL; Refill: 1  -     Insulin Syringe-Needle U-100 (BD Veo Insulin Syringe U/F) 31G X 15/64\" 0.3 ML misc; For use to give insulin from vial up to 5 times a day. Can substitute based on availability and insurance.  Dispense: 500 each; Refill: 4  -     insulin glargine (Lantus) 100 UNIT/ML injection; Inject 15 Units under the skin into the appropriate area as directed Daily.  Dispense: 30 mL; Refill: 0             Follow Up   Return in about 3 months (around 1/25/2025).    Change night time snacks to 1u:20g instead of 1:10g r/t noted hypoglycemia on cgm review  She did not like 10u of lantus  Labs today  Continue statin and ace further recommendations to follow as needed     Patient was given instructions and " counseling regarding her condition or for health maintenance advice. Please see specific information pulled into the AVS if appropriate.     JEN Agrawal

## 2024-10-26 LAB
ALBUMIN SERPL-MCNC: 4.4 G/DL (ref 3.5–5.2)
ALBUMIN/CREAT UR: 3 MG/G CREAT (ref 0–29)
ALBUMIN/GLOB SERPL: 2 G/DL
ALP SERPL-CCNC: 51 U/L (ref 39–117)
ALT SERPL-CCNC: 13 U/L (ref 1–33)
AST SERPL-CCNC: 22 U/L (ref 1–32)
BILIRUB SERPL-MCNC: 0.5 MG/DL (ref 0–1.2)
BUN SERPL-MCNC: 16 MG/DL (ref 8–23)
BUN/CREAT SERPL: 16 (ref 7–25)
CALCIUM SERPL-MCNC: 10.2 MG/DL (ref 8.6–10.5)
CHLORIDE SERPL-SCNC: 105 MMOL/L (ref 98–107)
CHOLEST SERPL-MCNC: 198 MG/DL (ref 0–200)
CO2 SERPL-SCNC: 27.9 MMOL/L (ref 22–29)
CREAT SERPL-MCNC: 1 MG/DL (ref 0.57–1)
CREAT UR-MCNC: 145.7 MG/DL
EGFRCR SERPLBLD CKD-EPI 2021: 61.1 ML/MIN/1.73
GLOBULIN SER CALC-MCNC: 2.2 GM/DL
GLUCOSE SERPL-MCNC: 98 MG/DL (ref 65–99)
HBA1C MFR BLD: 7.8 % (ref 4.8–5.6)
HDLC SERPL-MCNC: 139 MG/DL (ref 40–60)
IMP & REVIEW OF LAB RESULTS: NORMAL
LDLC SERPL CALC-MCNC: 51 MG/DL (ref 0–100)
MICROALBUMIN UR-MCNC: 4.6 UG/ML
POTASSIUM SERPL-SCNC: 4.9 MMOL/L (ref 3.5–5.2)
PROT SERPL-MCNC: 6.6 G/DL (ref 6–8.5)
SODIUM SERPL-SCNC: 141 MMOL/L (ref 136–145)
TRIGL SERPL-MCNC: 40 MG/DL (ref 0–150)
VLDLC SERPL CALC-MCNC: 8 MG/DL (ref 5–40)

## 2024-10-29 ENCOUNTER — PRIOR AUTHORIZATION (OUTPATIENT)
Dept: ENDOCRINOLOGY | Age: 69
End: 2024-10-29
Payer: MEDICARE

## 2024-11-02 ENCOUNTER — HOSPITAL ENCOUNTER (OUTPATIENT)
Dept: MRI IMAGING | Facility: HOSPITAL | Age: 69
Discharge: HOME | End: 2024-11-02
Payer: MEDICARE

## 2024-11-02 DIAGNOSIS — N64.89 RADIAL SCAR OF BREAST: ICD-10-CM

## 2024-11-02 DIAGNOSIS — N60.92 ATYPICAL DUCTAL HYPERPLASIA OF LEFT BREAST: ICD-10-CM

## 2024-11-02 DIAGNOSIS — Z91.89 AT HIGH RISK FOR BREAST CANCER: ICD-10-CM

## 2024-11-02 PROCEDURE — C8908 MRI W/O FOL W/CONT, BREAST,: HCPCS

## 2024-11-02 PROCEDURE — 0 GADOBENATE DIMEGLUMINE 529 MG/ML SOLUTION

## 2024-11-02 PROCEDURE — A9577 INJ MULTIHANCE: HCPCS

## 2024-11-02 PROCEDURE — C8937 CAD BREAST MRI: HCPCS

## 2024-11-02 RX ADMIN — GADOBENATE DIMEGLUMINE 13 ML: 529 INJECTION, SOLUTION INTRAVENOUS at 14:59

## 2024-11-04 ENCOUNTER — TELEPHONE (OUTPATIENT)
Dept: MAMMOGRAPHY | Facility: HOSPITAL | Age: 69
End: 2024-11-04
Payer: MEDICARE

## 2024-11-04 NOTE — TELEPHONE ENCOUNTER
Telephoned patient to get her scheduled for her MRI Biopsy. She will call me later she is at work and can't talk at this time.

## 2024-11-05 ENCOUNTER — TELEPHONE (OUTPATIENT)
Dept: MAMMOGRAPHY | Facility: HOSPITAL | Age: 69
End: 2024-11-05
Payer: MEDICARE

## 2024-11-05 ENCOUNTER — TELEPHONE (OUTPATIENT)
Dept: ENDOCRINOLOGY | Age: 69
End: 2024-11-05
Payer: MEDICARE

## 2024-11-05 NOTE — TELEPHONE ENCOUNTER
Called Mrs. Iniguez Regarding her recent Breast MRI . There was an abnormality seen in the left breast for which biopsy is recommended. She is recommended to proceed with a left breast MRI guided biopsy. This was schedule for 11/29/24 at 0945, her arrival time is 0845 .She voiced understanding and will call with further questions or concerns.

## 2024-11-22 NOTE — PROGRESS NOTES
11/29/24 0001   Pre-Procedure Phone Call   Procedure Time Verified Yes   Arrival Time 0845   Procedure Location Verified Yes   Medical History Reviewed Yes   NPO Status Reinforced No   Ride and Caregiver Arranged Yes   Patient Knows to Bring Current Medications No   Bring Outside Films Requested No

## 2024-11-29 ENCOUNTER — HOSPITAL ENCOUNTER (OUTPATIENT)
Dept: MRI IMAGING | Facility: HOSPITAL | Age: 69
Discharge: HOME | End: 2024-11-29
Payer: MEDICARE

## 2024-11-29 ENCOUNTER — HOSPITAL ENCOUNTER (OUTPATIENT)
Dept: MAMMOGRAPHY | Facility: HOSPITAL | Age: 69
Discharge: HOME | End: 2024-11-29
Payer: MEDICARE

## 2024-11-29 VITALS
DIASTOLIC BLOOD PRESSURE: 71 MMHG | HEIGHT: 61 IN | RESPIRATION RATE: 16 BRPM | OXYGEN SATURATION: 100 % | TEMPERATURE: 98.2 F | WEIGHT: 140 LBS | BODY MASS INDEX: 26.43 KG/M2 | HEART RATE: 56 BPM | SYSTOLIC BLOOD PRESSURE: 146 MMHG

## 2024-11-29 DIAGNOSIS — R92.8 ABNORMAL MAMMOGRAM: ICD-10-CM

## 2024-11-29 LAB — CREAT BLDA-MCNC: 1.1 MG/DL (ref 0.6–1.3)

## 2024-11-29 PROCEDURE — 25510000002 GADOBENATE DIMEGLUMINE 529 MG/ML SOLUTION: Performed by: STUDENT IN AN ORGANIZED HEALTH CARE EDUCATION/TRAINING PROGRAM

## 2024-11-29 PROCEDURE — A9270 NON-COVERED ITEM OR SERVICE: HCPCS | Performed by: STUDENT IN AN ORGANIZED HEALTH CARE EDUCATION/TRAINING PROGRAM

## 2024-11-29 PROCEDURE — 63710000001 DIAZEPAM 5 MG TABLET: Performed by: STUDENT IN AN ORGANIZED HEALTH CARE EDUCATION/TRAINING PROGRAM

## 2024-11-29 PROCEDURE — 77065 DX MAMMO INCL CAD UNI: CPT

## 2024-11-29 PROCEDURE — 25010000002 LIDOCAINE 1 % SOLUTION: Performed by: STUDENT IN AN ORGANIZED HEALTH CARE EDUCATION/TRAINING PROGRAM

## 2024-11-29 PROCEDURE — 25010000002 LIDOCAINE-EPINEPHRINE (PF) 1 %-1:200000 SOLUTION: Performed by: STUDENT IN AN ORGANIZED HEALTH CARE EDUCATION/TRAINING PROGRAM

## 2024-11-29 PROCEDURE — A4648 IMPLANTABLE TISSUE MARKER: HCPCS

## 2024-11-29 PROCEDURE — C1894 INTRO/SHEATH, NON-LASER: HCPCS

## 2024-11-29 PROCEDURE — A9577 INJ MULTIHANCE: HCPCS | Performed by: STUDENT IN AN ORGANIZED HEALTH CARE EDUCATION/TRAINING PROGRAM

## 2024-11-29 PROCEDURE — 82565 ASSAY OF CREATININE: CPT

## 2024-11-29 RX ORDER — LIDOCAINE HYDROCHLORIDE 10 MG/ML
10 INJECTION, SOLUTION INFILTRATION; PERINEURAL ONCE
Status: COMPLETED | OUTPATIENT
Start: 2024-11-29 | End: 2024-11-29

## 2024-11-29 RX ORDER — DIAZEPAM 5 MG/1
5 TABLET ORAL ONCE AS NEEDED
Status: COMPLETED | OUTPATIENT
Start: 2024-11-29 | End: 2024-11-29

## 2024-11-29 RX ADMIN — DIAZEPAM 5 MG: 5 TABLET ORAL at 09:45

## 2024-11-29 RX ADMIN — GADOBENATE DIMEGLUMINE 13 ML: 529 INJECTION, SOLUTION INTRAVENOUS at 10:21

## 2024-11-29 RX ADMIN — LIDOCAINE HYDROCHLORIDE 1 ML: 10 INJECTION, SOLUTION INFILTRATION; PERINEURAL at 10:35

## 2024-11-29 RX ADMIN — LIDOCAINE HYDROCHLORIDE 15 ML: 10; .005 INJECTION, SOLUTION EPIDURAL; INFILTRATION; INTRACAUDAL; PERINEURAL at 10:35

## 2024-11-29 NOTE — NURSING NOTE
Biopsy site to left outer breast clear with Dermabond dry and intact. No firmness or swelling noted at or around biopsy site. Denies pain. Ice pack with protective covering applied to biopsy site. Discharge instructions discussed with understanding voiced by patient. Copies provided to patient. No distress noted. This RN wheeled patient to Patient Discharge to meet friend with car.

## 2024-11-30 ENCOUNTER — TELEPHONE (OUTPATIENT)
Dept: RADIOLOGY | Facility: HOSPITAL | Age: 69
End: 2024-11-30
Payer: MEDICARE

## 2024-12-02 LAB
CYTO UR: NORMAL
LAB AP CASE REPORT: NORMAL
LAB AP INTRADEPARTMENTAL CONSULT: NORMAL
PATH REPORT.FINAL DX SPEC: NORMAL
PATH REPORT.GROSS SPEC: NORMAL

## 2024-12-15 ENCOUNTER — TELEPHONE (OUTPATIENT)
Dept: URGENT CARE | Facility: CLINIC | Age: 69
End: 2024-12-15

## 2024-12-19 ENCOUNTER — OFFICE VISIT (OUTPATIENT)
Dept: SPORTS MEDICINE | Facility: CLINIC | Age: 69
End: 2024-12-19
Payer: MEDICARE

## 2024-12-19 VITALS
OXYGEN SATURATION: 99 % | HEART RATE: 62 BPM | DIASTOLIC BLOOD PRESSURE: 70 MMHG | HEIGHT: 61 IN | RESPIRATION RATE: 16 BRPM | SYSTOLIC BLOOD PRESSURE: 128 MMHG | WEIGHT: 138 LBS | BODY MASS INDEX: 26.06 KG/M2

## 2024-12-19 DIAGNOSIS — M25.521 RIGHT ELBOW PAIN: Primary | ICD-10-CM

## 2024-12-19 DIAGNOSIS — S52.124A CLOSED NONDISPLACED FRACTURE OF HEAD OF RIGHT RADIUS, INITIAL ENCOUNTER: ICD-10-CM

## 2024-12-19 PROCEDURE — 1159F MED LIST DOCD IN RCRD: CPT | Performed by: FAMILY MEDICINE

## 2024-12-19 PROCEDURE — 99213 OFFICE O/P EST LOW 20 MIN: CPT | Performed by: FAMILY MEDICINE

## 2024-12-19 PROCEDURE — 1160F RVW MEDS BY RX/DR IN RCRD: CPT | Performed by: FAMILY MEDICINE

## 2024-12-19 PROCEDURE — 3074F SYST BP LT 130 MM HG: CPT | Performed by: FAMILY MEDICINE

## 2024-12-19 PROCEDURE — 3078F DIAST BP <80 MM HG: CPT | Performed by: FAMILY MEDICINE

## 2024-12-19 NOTE — PATIENT INSTRUCTIONS
Sling daily for comfort. Can wear at night.  Gentle range of motion three times daily for right shoulder, right elbow.  No heavy lifting with right arm, no more than 5 pounds

## 2024-12-19 NOTE — PROGRESS NOTES
"Yulia is a 69 y.o. year old female presents to [facility]    Chief Complaint   Patient presents with    Right Elbow - Pain, Initial Evaluation     [facility] referral for eval of RT elbow pain with radial head fracture - seen 12/15/2024 s/p a fall the day prior - right arm bruised today, she isn't convinced she has a fracture, does has some ROM but still has pain throughout the forearm and when picking up objects, did wrap with ace but had increased swelling, mild swelling today  - here for further evaluation and treatment        History of Present Illness  History of Present Illness  The patient presents for evaluation of right elbow pain.    She reports an improvement in her elbow condition, with regained mobility. However, she experiences weakness and pain when attempting to lift objects. She is right-handed and works. She also notes occasional numbness and tingling in her hand, which is currently present during the visit. The injury occurred on Saturday night, prompting a hospital visit the following day. She has been managing the discomfort with ibuprofen 800, prescribed by urgent care, but reports gastrointestinal upset as a side effect. She does not feel that she is in any great need of pain medication. She has a history of a knee fracture sustained in April 2024.    Supplemental Information  She has a dentist appointment today because she chipped her front tooth while running with her dog.    MEDICATIONS  Current: Ibuprofen    I have reviewed the patient's medical, family, and social history in detail and updated the computerized patient record.    /70 (BP Location: Left arm, Patient Position: Sitting, Cuff Size: Adult)   Pulse 62   Resp 16   Ht 154.9 cm (60.98\")   Wt 62.6 kg (138 lb)   LMP  (LMP Unknown)   SpO2 99%   BMI 26.09 kg/m²      Physical Exam    Vital signs reviewed.   General: No acute distress.  Eyes: conjunctiva clear; pupils equally round and " reactive  ENT: external ears atraumatic  CV: no peripheral edema  Resp: normal respiratory effort, no use of accessory muscles  Skin: no rashes or wounds; normal turgor  Psych: mood and affect appropriate; recent and remote memory intact  Neuro: sensation to light touch intact    MSK Exam  Physical Exam  Right elbow shows minimal soft tissue swelling. She lacks full range of motion with both terminal flexion and extension lacking approximately 5 degrees. There is bony tenderness along the radial head. Right upper extremity is neurovascularly intact. She has full motion at the wrist and hand.    XR Forearm 2 View Right (12/15/2024 11:55)       Results  Imaging  Nondisplaced fracture at the radial head of the right elbow.         Diagnoses and all orders for this visit:    Right elbow pain    Closed nondisplaced fracture of head of right radius, initial encounter      Assessment & Plan  1. Nondisplaced radial head fracture, right elbow.  The fracture is stable and will heal without surgical intervention. She is advised to use a sling for the next few weeks to alleviate stress on the elbow and to avoid lifting objects with the right hand. The sling should be removed several times daily to perform gentle range of motion exercises for both the shoulder and elbow to prevent loss of mobility. A compression sleeve may be used for additional support if it does not cause discomfort. Ice application to the fracture site is recommended for pain relief. Over-the-counter Tylenol can be taken if needed and if it does not cause any adverse effects. A repeat x-ray will be conducted in 3 weeks to monitor the healing progress.    Follow-up  The patient will follow up in 3 weeks.          Follow Up     Patient was given instructions and counseling regarding her condition or for health maintenance advice. Please see specific information pulled into the AVS if appropriate.     Patient or patient representative verbalized consent for the  use of Ambient Listening during the visit with  WOODY Leonard Jr., DO for chart documentation. 12/19/2024  09:34 EST

## 2025-01-02 NOTE — PROGRESS NOTES
ASSESSMENT/PLAN:     69 y.o. female with:    (1) High risk screening for breast cancer:   - Lopez Rojas lifetime breast cancer risk: 21%. She does qualify for high risk screening.  - Annual mammogram due 7/2023. She has not completed this. Order placed. Will call her with the results.   - Annual MRI 3/2024. BIRADS2.  - She declines referral to medical oncology.   - Follow up in 1 year.    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    (3) Newly diagnosed left breast radial scar.   S/p MRI biopsy 11/29/2024.  She returned as a radial scar.  Discussed options including observation versus left breast wire localized excisional biopsy.  She would like to proceed with excision due to risk of pathologic upgrade.  Orders placed.  Will schedule.    (3) Other:  - Breast MRI 10/2022 noted right hepatic and right renal lesions. MRI Abdomen showed benign hemangiomas and simple renal cyst.     Chief complaint: management of breast cancer risk    HPI: Ms. Yulia Iniguez is seen at the request of No ref. provider found. The patient is a 67 year old woman being seen for a new diagnosis of left breast ADH.    This was initially detected as an imaging abnormality on routine screening. Her work-up is detailed in the breast history section below. She has had regular annual mammogram. She denies any prior history of abnormal mammograms or breast biopsies. She denies any breast lumps, pain, skin changes, or nipple discharge.  She denies any family history of breast or ovarian cancer.     1/24/2023 she presents today for follow-up.  She is overall doing well with no new issues.  Her pain is controlled.  She is getting back to her daily activities.    2/12/2024 She is here today for a follow-up. She denies any new breast complaints - no masses, skin changes, or nipple discharge. Denies any other health updates. She has not  completed her screening mammogram.      1/2/2025 She presents today for follow up.  She follows up after her biopsy returned as a radial scar.  She has no other new findings with her breast exam.  She is up-to-date with primary care and GYN.  She is up-to-date on her colonoscopy.  She did fall twice recently with her dog.    TIMELINE OF WORKUP:  7/28/2022 Bilateral Diagnostic Mammogram with US:   There are scattered areas of fibroglandular density.    There is an asymmetry in the outer anterior left breast on the CC view, which effaces with spot compression and has no correlate on additional views, consistent with superimposition of normal breast parenchyma.   There is a 0.5 cm oval mass with obscured margins in the lower inner anterior left breast. There is a persistent at least 1 cm oval mass in the outer central posterior left breast.  There is a persistent approximately 1.1 cm focal asymmetry with questioned architectural distortion in the outer central posterior left breast, approximately 1.5 cm anterior and slightly medial to the above mass.   There is a persistent approximately 0.7 cm focal asymmetry in the slightly upper outer posterior left breast, which is medial and superior to the focal asymmetry with questioned distortion.  These areas were further assessed with ultrasound. There are no suspicious calcifications in the left breast. There are no suspicious masses, calcifications, or areas of architectural distortion in the right breast.  ULTRASOUND:  Targeted sonographic evaluation of the right breast was performed from 12:00 to 4:00 and from 7:00 to 8:00 in the region of the mammographic abnormalities.   At 2:00, 8 cm from the nipple, there is a 0.4 x 0.2 x 0.7 cm benign-appearing cyst corresponding to a focal asymmetry on mammogram.   At 2:30, 8 cm from the nipple, there is a 0.3 x 0.2 x 0.3 cm benign-appearing cyst. At 3:00, 10 cm from the nipple, there is a 0.7 x 0.5 x 0.6 cm oval complicated cyst  versus solid mass with indistinct/angular margins corresponding to a mass on mammogram, which is suspicious.   At 8:00, 2 cm from the nipple, there is a 0.4 x 0.3 x 0.3 cm benign-appearing cyst corresponding to a mass on mammogram. No sonographic correlate is identified for the focal asymmetry with questioned distortion on mammogram.  IMPRESSION:  1.  Suspicious focal asymmetry with questioned distortion in the outer central posterior left breast without a sonographic correlate. Recommendfurther evaluation with stereotactic core needle biopsy.  2.  Suspicious 0.7 cm complicated cyst versus solid mass at 3:00 in the left breast. Recommend further evaluation with ultrasound-guided core needle biopsy.  3.  No mammographic evidence of malignancy in the right breast.  BI-RADS Category 4: Suspicious    9/6/2022 Left Breast stereotactic biopsy, Left breast US guided aspiration:   Targeting on the lesion in the left breast at the 5:30 position was performed. Multiple tissue specimens were obtained. A specimen radiograph was obtained demonstrating no specific abnormality  within the specimen. A barbell shaped metallic clip was placed to cierra the site. Postbiopsy mammography of the left breast demonstrates placement of a barbell shaped metallic clip at the 5:30 o'clock position without evidence for clip migration or for a postbiopsy hematoma.    The patient was taken to the ultrasound procedure suite and preliminary sonography of the left breast was performed. The lesion at the 3:00 position on the order of 10 cm from the nipple was visualized. The lesion had a more cystic appearance on the current examination without evidence for internal or peripheral vascularity. A decision was made to attempt to aspirate the lesion under sonographic guidance. The overlying skin was prepped in usual sterile fashion. Local anesthesia was achieved with 1% lidocaine. An 18-gauge needle was inserted into the lesion under sonographic guidance.  1 cc of tea-colored fluid was aspirated. The fluid was discarded. This confirms the benign cystic nature of the lesion. The patient tolerated the procedure without evidence for complication.    The pathology result from the 5:30 o'clock biopsy has returned as focal ADH with clustered ductal cysts are involved by low-grade florid epithelial proliferation. This is concordant with imaging findings.  IMPRESSION:  Technically successful Tomosynthesis guided Mammotome vacuum assisted left breast biopsy with placement of a barbell shaped metallic clip and ultrasound-guided left breast cyst aspiration. The pathology result has returned as ADH. Surgical excision is recommended.  Pathology:   Final Diagnosis   1. Left Breast Stereotactic Biopsy:               A. Focal atypical ductal hyperplasia (ADH) involving clustered ductal cysts (see comment).               B. Fibrocystic change, clustered ductal cysts, adenosis and columnar cell change.                C. No definitive in-situ carcinoma nor invasive carcinoma identified (see comment).               D. Rare microcalcification within benign breast tissue.     10/21/2022 Bilateral Breast MRI:   IMPRESSION AND RECOMMENDATION:   1.  A biopsy clip at 4:00 in the posterior left breast marks the site of biopsy-proven atypia, for which surgical management is recommended. The biopsy clip is located at the margin of suspicious 4.2 cm nonmass enhancement, for which MR guided core needle biopsy is recommended preoperatively.  2.  No MRI evidence of malignancy in the right breast.  3.  Incompletely assessed right hepatic and right renal lesions. Recommend further evaluation with contrast-enhanced CT or MRI of the abdomen.  BI-RADS Category 4: Suspicious    12/8/2022 Left Breast MRI Guided Biopsy   IMPRESSION/RECOMMENDATIONS:  1. MRI guided biopsy of 4.3 cm nonmass enhancement at 3:00 in the posterior left breast, marked by a stoplight clip. Pathology demonstrates possible small radial  sclerosing lesion, which is high risk and concordant with the imaging assessment. Surgical management is recommended with bracketed preoperative localization targeting the barbell clip at the site of biopsy-proven left breast atypia with wide superior margins at this level and the new stoplight clip to include the bridging nonmass enhancement.     1/5/2023  Left breast wire bracketed excisional biopsy:  Final Diagnosis   1. Breast, Left, Lumpectomy: Benign breast tissue with                A. Two separate biopsy sites.               B. Two clips identified associated with each biopsy site, one being a stoplight shaped clip and one a barbell shaped clip.               C. Tissue surrounding the stoplight clip shows extensive biopsy site changes, florid ductal hyperplasia of the usual type and columnar cell change with microcalcifications in the nonneoplastic tissue.               D. Tissue surrounding the barbell clip shows biopsy site changes and columnar cell change.               E. Margins free of atypia, in situ and invasive disease.     2/6/2023 MRI Abdomen:  IMPRESSION:  1. Three hepatic lesions are delineated that are favored to represent a combination of typical and atypical hemangiomas. The right renal lesion is favored to represent a simple cyst.  2. Sliver of fluid within the left breast incompletely imaged is favored to represent postoperative change/small fluid collection.    3/15/2024 Bilateral Screening Mammogram:   FINDINGS: Bilateral digital CC and MLO mammographic and digital Tomosynthesis images were obtained. Comparison is made to prior studies dated 1/5/2023, 12/8/2022 and 7/28/2022. The parenchyma of both breasts  is largely fatty-replaced. I see no new or dominant masses, areas of architectural distortion or skin thickening. Postsurgical change of the left breast is noted. There is no evidence for axillary lymphadenopathy  or nipple retraction.   IMPRESSION:  1. There is no evidence for  malignancy or significant change in either breast. Routine followup mammography is recommended.   BI-RADS category 2: Benign.    11/2/2024 Bilateral Breast MRI  FINDINGS: The breasts are almost entirely fat. There is mild background parenchymal enhancement.  RIGHT BREAST:    No suspicious enhancing mass or area of non-mass enhancement is identified. The visualized axilla is within normal limits.   LEFT BREAST:    Benign-appearing postsurgical changes in the left breast. At 4:00 in the middle left breast, 4.3 cm posterior to the nipple, there is 1 cm AP dimension, 0.5 cm transverse dimension, 0.7 cm craniocaudal dimension non-mass enhancement, which is suspicious. The visualized axilla is within normal limits.  EXTRAMAMMARY FINDINGS:  There are no pathologically enlarged internal mammary chain lymph nodes on either side.    There is a tiny hiatal hernia.  IMPRESSION AND RECOMMENDATION:   1.  Suspicious 1 cm non-mass enhancement at 4:00 in the middle left breast. Recommend further evaluation with MRI guided core needle biopsy.  2.  No MRI evidence of malignancy in the right breast.  BI-RADS Category 4: Suspicious    11/29/2024 Left Breast MRI Guided Biopsy:   The non-mass enhancement in the outer middle left breast was targeted via a lateral approach. Using dedicated breast MRI-CAD software, the location and depth of the lesion were calculated. The overlying skin was  prepped in a sterile fashion. 1 mL of 1% lidocaine and 10 mL of 1% lidocaine with epinephrine were used to anesthetize the skin and deeper soft tissues. A nonferromagnetic sheath was placed. Subsequent imaging demonstrated adequate positioning of the sheath. A 9 gauge [employer] [facility] vacuum-assisted biopsy device  was then inserted and 8 core samples were obtained. An additional 5 mL of 1% lidocaine with epinephrine was administered during sampling. Post-biopsy MRI images confirmed adequate sampling. An infinity clip was deployed at the biopsy site.  The  patient tolerated the procedure well. During application of manual pressure for hemostasis, the biopsy clip was expelled from the breast. A repeat clip was then placed manually along the biopsy tract by the radiologist. There were no other complications. Post-procedural digital orthogonal mammographic views of the left breast  demonstrate the Infinity clip at the expected location at the site of biopsy in the lower outer middle left breast. There is an at least 3.6 cm postbiopsy hematoma.  IMPRESSION/RECOMMENDATIONS:  1. MRI guided biopsy of 1 cm non-mass enhancement in the middle left breast, marked by an infinity clip. Pathology is high risk and concordant with the imaging assessment. Recommend surgical management.     Final Diagnosis   1.  Breast, left 4 o'clock position, MRI-guided core biopsy: (Infinity clip)               A.  Radial scar with clustered cysts ,columnar cell change, apocrine metaplasia and ductal hyperplasia of the usual type.     MEDICAL HISTORY:   Gynecologic History:   . P:3 AB:1  Age at first childbirth: 19  Lactation/How long: None  Age at menarche: 13  Age at menopause: 52  Total years of oral contraceptive use: several years previously  Total years of hormone replacement therapy: none    Past Medical History:   Past Medical History:   Diagnosis Date    Anemia     Anxiety     Cataract     CRI (chronic renal insufficiency), stage 2 (mild)     PATIENT DENIES    Essential hypertension, benign     Former smoker     History of substance abuse     DOWNERS AGE 27    Hyperlipidemia     Osteopenia     actual problem not clear and no outside DXA info    Osteoporosis without current pathological fracture     Shortness of breath     PATIENT STATES DUE TO ANXIETY    Type 2 diabetes mellitus       Past Surgical History:    Past Surgical History:   Procedure Laterality Date    BREAST BIOPSY  left    BREAST CYST ASPIRATION      BREAST LUMPECTOMY Left 2023    Procedure: left breast wire bracketed  excisional biopsy;  Surgeon: Alanna Hamm MD;  [place] [facility] [place] [facility] [place]  Service: General;  Laterality: Left;     SECTION      x2    COLONOSCOPY      normal    HAND SURGERY      trigger finger    HERNIA REPAIR      left side of abdomen    LAPAROSCOPIC TUBAL LIGATION      X2    TONSILLECTOMY      TUBAL ABDOMINAL LIGATION      US GUIDED CYST ASPIRATION BREAST N/A 2022      Family History:    Family History   Problem Relation Age of Onset    Thyroid disease Mother         weight gain thyroid    No Known Problems Father     Thyroid disease Sister         graves disease    ADD / ADHD Son     Malig Hyperthermia Neg Hx       Social History:   Social History     Socioeconomic History    Marital status:    Tobacco Use    Smoking status: Former     Current packs/day: 0.00     Average packs/day: 2.0 packs/day for 14.0 years (28.0 ttl pk-yrs)     Types: Cigarettes     Start date: 1970     Quit date: 10/3/2012     Years since quittin.2    Smokeless tobacco: Never    Tobacco comments:     previously smoked about 2 packs per day for about  35 years; did not smoke during pregnancies     Caffeine 2 Cups/day    Vaping Use    Vaping status: Never Used   Substance and Sexual Activity    Alcohol use: Yes     Comment: social    Drug use: Never     Comment: [employer] IN HIGH SCHOOL AND DOWNERS AGE 27    Sexual activity: Not Currently     Birth control/protection: Tubal ligation     Allergies:   Allergies   Allergen Reactions    Buspirone Nausea Only and Other (See Comments)     Nightmares, Edgy     Medications:     Current Outpatient Medications:     Accu-Chek Softclix Lancets lancets, Check 5 times a day on insulin tx, poor control, hypoglycemia. Dx: E 10.65, Disp: 500 each, Rfl: 4    albuterol sulfate  (90 Base) MCG/ACT inhaler, Inhale 2 puffs Every 6 (Six) Hours As Needed for Shortness of Air., Disp: 18 g, Rfl: 0    alendronate (FOSAMAX) 70 MG tablet, TAKE 1 TABLET EVERY 7 DAYS WITH LARGE GLASS  "OF WATER 30 MIN BEFORE FIRST FOOD, DRINK, MEDS; AVOID LYING DOWN FOR 30 MIN, Disp: 12 tablet, Rfl: 3    aspirin 81 MG EC tablet, Take 1 tablet by mouth Daily., Disp: , Rfl:     atorvastatin (LIPITOR) 10 MG tablet, Take 1 tablet by mouth Daily., Disp: 90 tablet, Rfl: 3    Blood Glucose Monitoring Suppl (Accu-Chek Guide) w/Device kit, , Disp: , Rfl:     Continuous Blood Gluc Sensor (Dexcom G6 Sensor), Dipsense Dexcom G6 Sensors, to replace every 10 days, 3 sensors per 30 day period (NDC #[address]-24). Check 6 times a day. Dx: E 10.65, Disp: 9 each, Rfl: 4    Continuous Blood Gluc Transmit (Dexcom G6 Transmitter) misc, 1 each Every 3 (Three) Months. Dispense 1 Dexcom G6 Transmitter for each 3 month period (NDC #[address]-36). Check 6 times a day. Dx: E 10.65, Disp: 1 each, Rfl: 4    glucose blood (Accu-Chek Guide) test strip, Check 5 times a day on insulin tx, poor control, hypoglycemia. Dx: E 10.65, Disp: 500 each, Rfl: 4    hydroCHLOROthiazide 25 MG tablet, Take 1 tablet by mouth Daily., Disp: 90 tablet, Rfl: 3    ibuprofen (ADVIL,MOTRIN) 800 MG tablet, Take 1 tablet by mouth Every 8 (Eight) Hours As Needed for Mild Pain or Moderate Pain., Disp: 15 tablet, Rfl: 0    Insulin Aspart (novoLOG) 100 UNIT/ML injection, Use as directed for meal & correction coverage up to 35 units a day, Disp: 45 mL, Rfl: 1    insulin glargine (Lantus) 100 UNIT/ML injection, Inject 15 Units under the skin into the appropriate area as directed Daily., Disp: 30 mL, Rfl: 0    Insulin Syringe-Needle U-100 (BD Veo Insulin Syringe U/F) 31G X 15/64\" 0.3 ML misc, For use to give insulin from vial up to 5 times a day. Can substitute based on availability and insurance., Disp: 500 each, Rfl: 4    lisinopril (PRINIVIL,ZESTRIL) 40 MG tablet, Take 1 tablet by mouth Daily., Disp: 90 tablet, Rfl: 3    multivitamin with minerals tablet tablet, Take 1 tablet by mouth Daily. HOLD ONE WEEK FOR SURGERY, Disp: , Rfl:     Pyridoxine HCl (VITAMIN B6 PO), Take " 1 tablet by mouth Daily., Disp: , Rfl:     vitamin B-12 (CYANOCOBALAMIN) 1000 MCG tablet, Take 1 tablet by mouth Every Other Day., Disp: , Rfl:     VITAMIN D, CHOLECALCIFEROL, PO, Take 1 tablet by mouth Daily., Disp: , Rfl:     Labs:    Labs from 2024 reviewed by me     ROS:   Constitutional: Negative for fevers or chills  HENT: Negative for hearing loss or runny nose  Eyes: Negative for vision changes or scleral icterus  Respiratory: Negative for cough or shortness of breath  Cardiovascular: Negative for chest pain or heart palpitations  Gastrointestinal: Negative for abdominal pain, nausea, vomiting, constipation, melena, or hematochezia  Genitourinary: Negative for hematuria or dysuria  Musculoskeletal: Negative for joint swelling or gait instability  Neurologic: Negative for tremors or seizures  Psychiatric: Negative for suicidal ideations or depression  All other systems reviewed and negative    PHYSICAL EXAM:   ECO - Asymptomatic  Constitutional: Well-developed, well-nourished, no acute distress  Eyes: Conjunctiva normal, sclera nonicteric  ENMT: Hearing grossly normal, oral mucosa moist  Neck: Supple, no palpable mass, trachea midline  Respiratory: Clear to auscultation, normal inspiratory effort  Cardiovascular: Regular rate, no murmur, no peripheral edema, no jugular venous distention  Breast:   Right: No visible abnormalities on inspection while seated, with arms raised or hands on hips. No masses, skin changes, or nipple abnormalities.  Left: No visible abnormalities on inspection while seated, with arms raised or hands on hips. No masses, skin changes, or nipple abnormalities.  Left breast periareolar incision 1:30-4:00 well healed, no masses or skin changes. Biopsy site noted in L breast.   No clinical chest wall involvement.  Lymphatics (palpable nodes): No cervical, supraclavicular, or axillary lymphadenopathy  Skin: Warm, dry, no rash on visualized skin surfaces  Musculoskeletal: Symmetric  strength, normal gait  Psychiatric: Alert and oriented ×3, normal affect     Alanna Hamm MD   [facility] - General Surgery   [address], [address]  [place] [address]    [address], [address]  [place] [address]    Office: [phone]  Fax: [phone]

## 2025-01-03 ENCOUNTER — OFFICE VISIT (OUTPATIENT)
Dept: SURGERY | Facility: CLINIC | Age: 70
End: 2025-01-03
Payer: MEDICARE

## 2025-01-03 VITALS
WEIGHT: 148.6 LBS | DIASTOLIC BLOOD PRESSURE: 72 MMHG | HEIGHT: 61 IN | HEART RATE: 69 BPM | OXYGEN SATURATION: 100 % | BODY MASS INDEX: 28.05 KG/M2 | SYSTOLIC BLOOD PRESSURE: 126 MMHG

## 2025-01-03 DIAGNOSIS — N64.89 RADIAL SCAR OF BREAST: ICD-10-CM

## 2025-01-03 DIAGNOSIS — Z91.89 AT HIGH RISK FOR BREAST CANCER: Primary | ICD-10-CM

## 2025-01-06 ENCOUNTER — PREP FOR SURGERY (OUTPATIENT)
Dept: OTHER | Facility: HOSPITAL | Age: 70
End: 2025-01-06
Payer: MEDICARE

## 2025-01-06 DIAGNOSIS — N64.89 RADIAL SCAR OF BREAST: Primary | ICD-10-CM

## 2025-01-06 RX ORDER — DIAZEPAM 5 MG/1
10 TABLET ORAL ONCE
OUTPATIENT
Start: 2025-01-06 | End: 2025-01-06

## 2025-01-13 ENCOUNTER — PREP FOR SURGERY (OUTPATIENT)
Dept: OTHER | Facility: HOSPITAL | Age: 70
End: 2025-01-13
Payer: MEDICARE

## 2025-01-13 DIAGNOSIS — N64.89 RADIAL SCAR OF BREAST: Primary | ICD-10-CM

## 2025-01-24 ENCOUNTER — OFFICE VISIT (OUTPATIENT)
Dept: SPORTS MEDICINE | Facility: CLINIC | Age: 70
End: 2025-01-24
Payer: MEDICARE

## 2025-01-24 VITALS
HEIGHT: 61 IN | DIASTOLIC BLOOD PRESSURE: 84 MMHG | TEMPERATURE: 98.4 F | BODY MASS INDEX: 27.94 KG/M2 | RESPIRATION RATE: 16 BRPM | OXYGEN SATURATION: 99 % | SYSTOLIC BLOOD PRESSURE: 128 MMHG | WEIGHT: 148 LBS

## 2025-01-24 DIAGNOSIS — S52.124D CLOSED NONDISPLACED FRACTURE OF HEAD OF RIGHT RADIUS WITH ROUTINE HEALING, SUBSEQUENT ENCOUNTER: Primary | ICD-10-CM

## 2025-01-24 PROCEDURE — 3074F SYST BP LT 130 MM HG: CPT | Performed by: FAMILY MEDICINE

## 2025-01-24 PROCEDURE — 3079F DIAST BP 80-89 MM HG: CPT | Performed by: FAMILY MEDICINE

## 2025-01-24 PROCEDURE — 1160F RVW MEDS BY RX/DR IN RCRD: CPT | Performed by: FAMILY MEDICINE

## 2025-01-24 PROCEDURE — 1159F MED LIST DOCD IN RCRD: CPT | Performed by: FAMILY MEDICINE

## 2025-01-24 PROCEDURE — 99213 OFFICE O/P EST LOW 20 MIN: CPT | Performed by: FAMILY MEDICINE

## 2025-01-24 NOTE — PROGRESS NOTES
"Yulia is a 69 y.o. year old female presents to [facility]    Chief Complaint   Patient presents with    Elbow Pain     Right elbow is pain free       History of Present Illness  History of Present Illness  The patient presents for evaluation of a right elbow fracture.    She reports significant improvement in her condition, with only occasional twinges of pain. She has regained the ability to perform tasks such as opening jars. She discontinued the use of the sling shortly after her last visit, utilizing it sparingly for a few hours at home to alleviate discomfort. She has not engaged in any heavy lifting activities. The injury occurred on 12/15/2024.    I have reviewed the patient's medical, family, and social history in detail and updated the computerized patient record.    /84 (BP Location: Left arm, Patient Position: Sitting, Cuff Size: Adult)   Temp 98.4 °F (36.9 °C)   Resp 16   Ht 154.9 cm (60.98\")   Wt 67.1 kg (148 lb)   LMP  (LMP Unknown)   SpO2 99%   BMI 27.98 kg/m²      Physical Exam    Vital signs reviewed.   General: No acute distress.  Eyes: conjunctiva clear; pupils equally round and reactive  ENT: external ears atraumatic  CV: no peripheral edema  Resp: normal respiratory effort, no use of accessory muscles  Skin: no rashes or wounds; normal turgor  Psych: mood and affect appropriate; recent and remote memory intact  Neuro: sensation to light touch intact    MSK Exam  Physical Exam  In the right elbow, there is a lack of approximately 5 degrees of terminal extension. Bony tenderness along the radial head has improved. No erythema or warmth is observed.      Right Elbow X-Ray  Indication: Pain  Views: AP and Lateral views    Findings:  Fracture at the radial head with minimal displacement and callus formation  No bony lesion  Normal soft tissues  Normal joint spaces    No prior studies were available for comparison.      Results  Imaging  X-ray of the " right elbow shows callus formation around the fracture site, indicating healing.         Diagnoses and all orders for this visit:    Closed nondisplaced fracture of head of right radius with routine healing, subsequent encounter  -     XR Elbow 3+ View Right      Assessment & Plan  1. Fracture of the right elbow.  The fracture is stable and healing well, with callus formation observed on the x-ray. There is a slight angulation, which may have been present prior to the injury. She is advised to avoid heavy lifting for the next 6 weeks to allow the fracture to mature and stabilize. Ice application and ibuprofen are recommended for pain management. The use of a sling is not necessary at this time.    Follow-up  The patient will follow up in 6 weeks with another set of x-rays.          Follow Up     Patient was given instructions and counseling regarding her condition or for health maintenance advice. Please see specific information pulled into the AVS if appropriate.     Patient or patient representative verbalized consent for the use of Ambient Listening during the visit with  WOODY Leonard Jr.,  for chart documentation. 1/24/2025  12:34 EST

## 2025-01-31 ENCOUNTER — SPECIALTY PHARMACY (OUTPATIENT)
Dept: ENDOCRINOLOGY | Age: 70
End: 2025-01-31
Payer: MEDICARE

## 2025-01-31 ENCOUNTER — OFFICE VISIT (OUTPATIENT)
Dept: ENDOCRINOLOGY | Age: 70
End: 2025-01-31
Payer: MEDICARE

## 2025-01-31 VITALS
WEIGHT: 145.2 LBS | TEMPERATURE: 98.5 F | SYSTOLIC BLOOD PRESSURE: 124 MMHG | HEIGHT: 61 IN | HEART RATE: 52 BPM | BODY MASS INDEX: 27.41 KG/M2 | OXYGEN SATURATION: 100 % | DIASTOLIC BLOOD PRESSURE: 78 MMHG

## 2025-01-31 DIAGNOSIS — E78.00 HYPERCHOLESTEROLEMIA: ICD-10-CM

## 2025-01-31 DIAGNOSIS — N18.2 CRI (CHRONIC RENAL INSUFFICIENCY), STAGE 2 (MILD): ICD-10-CM

## 2025-01-31 DIAGNOSIS — E10.65 TYPE 1 DIABETES MELLITUS WITH HYPERGLYCEMIA: Primary | ICD-10-CM

## 2025-01-31 LAB
BUN SERPL-MCNC: 19 MG/DL (ref 8–23)
BUN/CREAT SERPL: 18.3 (ref 7–25)
CALCIUM SERPL-MCNC: 9.3 MG/DL (ref 8.6–10.5)
CHLORIDE SERPL-SCNC: 103 MMOL/L (ref 98–107)
CO2 SERPL-SCNC: 26.1 MMOL/L (ref 22–29)
CREAT SERPL-MCNC: 1.04 MG/DL (ref 0.57–1)
EGFRCR SERPLBLD CKD-EPI 2021: 58.3 ML/MIN/1.73
GLUCOSE SERPL-MCNC: 156 MG/DL (ref 65–99)
HBA1C MFR BLD: 7.6 % (ref 4.8–5.6)
POTASSIUM SERPL-SCNC: 4.8 MMOL/L (ref 3.5–5.2)
SODIUM SERPL-SCNC: 139 MMOL/L (ref 136–145)

## 2025-01-31 RX ORDER — INSULIN PMP CART,AUT,G6/7,CNTR
1 EACH SUBCUTANEOUS ONCE
Qty: 1 KIT | Refills: 0 | Status: SHIPPED | OUTPATIENT
Start: 2025-01-31 | End: 2025-01-31

## 2025-01-31 RX ORDER — INSULIN PMP CART,AUT,G6/7,CNTR
1 EACH SUBCUTANEOUS ONCE
Qty: 1 KIT | Refills: 0 | Status: SHIPPED | OUTPATIENT
Start: 2025-01-31 | End: 2025-01-31 | Stop reason: SDUPTHER

## 2025-01-31 NOTE — PROGRESS NOTES
Specialty Pharmacy Patient Management Program  Prior Authorization      Prior Authorization for OmniPod 5 Intro Kit was submitted    Authorization / Reference / Case Number: [id number]    CoverMyMeds Key: [id number]    Will recheck status on 2/3/25, if no response received.      Delmy Warner, PharmD, BCACP, BC-ADM, [facility]  Clinical Specialty Pharmacist, Endocrinology  1/31/2025  11:26 EST

## 2025-01-31 NOTE — PROGRESS NOTES
Specialty Pharmacy       Yulia Iniguez is a 69 y.o. female seen by an Endocrinology provider for Type 1 Diabetes.    Benefits Investigation Summary    Prescription: New Therapy    Dispensing pharmacy:     Copay amount: $232.25 for Intro Kit and $232.25 for 1 month supply of pots     PLAN: [facility]   BIN: [id number]  PCN: [id number]  RX GROUP: [id number]    Prior Auth and Med Assistance notes: PA's approved      Contacted patient to inform her of PA approval and cost. Patient stated the cost was too high but she's still interested in pump options either with OmniPods through [facility] or [facility].   Reviewed with provider who will send OmniPods supplies to [facility] to explore cost and have [employer] reach out to patient.     Delmy Warner, PharmD, BCACP, BC-ADM, [facility]  Clinical Specialty Pharmacist, Endocrinology  1/31/2025  12:04 EST

## 2025-01-31 NOTE — PROGRESS NOTES
Specialty Pharmacy Patient Management Program  Prior Authorization      Prior Authorization for OmniPod 5 Pods was submitted    Authorization / Reference / Case Number: [id number]    CoverMyMeds Key: [id number]    Will recheck status on 2/3/25, if no response received.      Delmy Warner, PharmD, BCACP, BC-ADM, [facility]  Clinical Specialty Pharmacist, Endocrinology  1/31/2025  11:27 EST

## 2025-01-31 NOTE — PROGRESS NOTES
"Chief Complaint  Diabetes    Subjective        Yulia Iniguez presents to [facility]  History of Present Illness    Type 1 dm, 1982   DM regimen: (Vials) lantus 15u QAM, novolog 1u:10g/carbs and 1u:20g/carbs for bedtime snack   Wants to consider insulin delivery device but only if she can get a tubeless device   renal appt on hold r/t cost  Last eye exam: 1/2025  Renal: lisinopril 40mg QD  CV: atorvastatin 10mg QD    Added a cinnamon supplement to help with her BS control which she feels has been helping      Cgm review 1/18/25-1/31/25  Average glucose 166  Gmi 7.3%  10% low: 3a, 10a, 4p-8p- verbalizes this is either from taking the insulin too soon before her meal or taking it too late in relationship to her meal   48% time in range  21% high   20% very high     Objective   Vital Signs:  /78   Pulse 52   Temp 98.5 °F (36.9 °C) (Oral)   Ht 154.9 cm (60.98\")   Wt 65.9 kg (145 lb 3.2 oz)   SpO2 100%   BMI 27.45 kg/m²   Estimated body mass index is 27.45 kg/m² as calculated from the following:    Height as of this encounter: 154.9 cm (60.98\").    Weight as of this encounter: 65.9 kg (145 lb 3.2 oz).            Physical Exam  Vitals reviewed.   Constitutional:       General: She is not in acute distress.  HENT:      Head: Normocephalic and atraumatic.   Cardiovascular:      Rate and Rhythm: Normal rate.   Pulmonary:      Effort: Pulmonary effort is normal. No respiratory distress.   Musculoskeletal:         General: No signs of injury. Normal range of motion.      Cervical back: Normal range of motion and neck supple.   Skin:     General: Skin is warm and dry.   Neurological:      Mental Status: She is alert and oriented to person, place, and time. Mental status is at baseline.   Psychiatric:         Mood and Affect: Mood normal.         Behavior: Behavior normal.         Thought Content: Thought content normal.         Judgment: Judgment normal.        Result Review :  The " following data was reviewed by: JEN Agrawal on 01/31/2025:  Common labs          8/9/2024    08:36 10/25/2024    09:56 11/29/2024    09:17   Common Labs   Glucose 89  98     BUN 13  16     Creatinine 1.06  1.00  1.10    Sodium 138  141     Potassium 5.5  4.9     Chloride 101  105     Calcium 9.7  10.2     Total Protein  6.6     Albumin  4.4     Total Bilirubin  0.5     Alkaline Phosphatase  51     AST (SGOT)  22     ALT (SGPT)  13     Total Cholesterol  198     Triglycerides  40     HDL Cholesterol  139     LDL Cholesterol   51     Hemoglobin A1C 7.60  7.80     Microalbumin, Urine  4.6                 Assessment and Plan   Diagnoses and all orders for this visit:    1. Type 1 diabetes mellitus with hyperglycemia (Primary)  -     Insulin Disposable Pump (Omnipod 5 [id number] Intro Gen 5) kit; Use 1 each 1 (One) Time for 1 dose. Dispense 1 Kit with Controller and 11 Pods (NDC [address]-76), replace every three days, 30 day supply requested.  Dispense: 1 kit; Refill: 0  -     Hemoglobin A1c  -     Basic Metabolic Panel    2. Hypercholesterolemia    3. CRI (chronic renal insufficiency), stage 2 (mild)    Other orders  -     Discontinue: Insulin Disposable Pump (Omnipod 5 [id number] Intro Gen 5) kit; Use 1 each 1 (One) Time for 1 dose. Dispense 1 Kit with Controller and 11 Pods (NDC [address]-32), replace every three days, 30 day supply requested.  Dispense: 1 kit; Refill: 0             Follow Up   Return in about 4 months (around 5/31/2025).    Cgm reviewed, still with concerning hypoglycemia   Wants to consider pump to help with hypoglycemia and better glucose control   Labs today  Did not want to make insulin changes at this time   Additional changes as needed based on labs and pump coverage     Patient was given instructions and counseling regarding her condition or for health maintenance advice. Please see specific information pulled into the AVS if appropriate.       JEN Agrawal

## 2025-01-31 NOTE — PROGRESS NOTES
Specialty Pharmacy Patient Management Program  Prior Authorization      Prior Authorization for OmniPod G5 Intro Kit was APPROVED    Approval Start Date: 01/01/25  Approval End Date: 01/31/26  Authorization / Reference / Case Number: [id number]     CoverMyMeds Key: [id number]    Scheduled new prior authorization renewal outreach task to be completed on 1/31/26    Delmy Warner PharmD, BCACP, BC-ADM, [facility]  Clinical Specialty Pharmacist, Endocrinology  1/31/2025  11:53 EST

## 2025-02-03 ENCOUNTER — DOCUMENTATION (OUTPATIENT)
Dept: ENDOCRINOLOGY | Age: 70
End: 2025-02-03
Payer: MEDICARE

## 2025-02-03 RX ORDER — INSULIN PMP CART,AUT,G6/7,CNTR
1 EACH SUBCUTANEOUS ONCE
Qty: 1 KIT | Refills: 0 | Status: SHIPPED | OUTPATIENT
Start: 2025-02-03 | End: 2025-02-03

## 2025-02-03 NOTE — PROGRESS NOTES
Left message for patient to call office back. To inform her that Carlyle from [employer] to talk to her

## 2025-02-04 ENCOUNTER — TREATMENT (OUTPATIENT)
Dept: ENDOCRINOLOGY | Age: 70
End: 2025-02-04
Payer: MEDICARE

## 2025-02-07 ENCOUNTER — OFFICE VISIT (OUTPATIENT)
Dept: SURGERY | Facility: CLINIC | Age: 70
End: 2025-02-07
Payer: MEDICARE

## 2025-02-07 VITALS
OXYGEN SATURATION: 92 % | HEIGHT: 61 IN | DIASTOLIC BLOOD PRESSURE: 72 MMHG | BODY MASS INDEX: 27.94 KG/M2 | WEIGHT: 148 LBS | HEART RATE: 55 BPM | SYSTOLIC BLOOD PRESSURE: 124 MMHG

## 2025-02-07 DIAGNOSIS — Z91.89 AT HIGH RISK FOR BREAST CANCER: Primary | ICD-10-CM

## 2025-02-07 PROCEDURE — 99024 POSTOP FOLLOW-UP VISIT: CPT | Performed by: STUDENT IN AN ORGANIZED HEALTH CARE EDUCATION/TRAINING PROGRAM

## 2025-02-07 PROCEDURE — 3078F DIAST BP <80 MM HG: CPT | Performed by: STUDENT IN AN ORGANIZED HEALTH CARE EDUCATION/TRAINING PROGRAM

## 2025-02-07 PROCEDURE — 1159F MED LIST DOCD IN RCRD: CPT | Performed by: STUDENT IN AN ORGANIZED HEALTH CARE EDUCATION/TRAINING PROGRAM

## 2025-02-07 PROCEDURE — 1160F RVW MEDS BY RX/DR IN RCRD: CPT | Performed by: STUDENT IN AN ORGANIZED HEALTH CARE EDUCATION/TRAINING PROGRAM

## 2025-02-07 PROCEDURE — 3074F SYST BP LT 130 MM HG: CPT | Performed by: STUDENT IN AN ORGANIZED HEALTH CARE EDUCATION/TRAINING PROGRAM

## 2025-02-18 ENCOUNTER — TELEPHONE (OUTPATIENT)
Dept: FAMILY MEDICINE CLINIC | Facility: CLINIC | Age: 70
End: 2025-02-18

## 2025-02-18 NOTE — TELEPHONE ENCOUNTER
Caller: Yulia Iniguez    Relationship: Self    Best call back number: [phone]    What is the best time to reach you: ANYTIME    Who are you requesting to speak with (clinical staff, provider,  specific staff member): CLINICAL     What was the call regarding: PATIENT IS CALLING IN  BECAUSE SHE IS UPSET THAT H=ON HER [facility] PROFILE IT SAYS SHE HAS BRITTLE BONES AND SUBSTANCE ABUSE AND SHE STATES SHE'S HAD NEITHER. SHE WOULD LIKE A CALLBACK FROM THE OFFICE TO HOW SHE CAN GET THIS FIXED AND WHERE IT WOULD'VE COME FROM.    PLEASE CALL     Is it okay if the provider responds through [employer]: NO

## 2025-02-21 ENCOUNTER — PRE-ADMISSION TESTING (OUTPATIENT)
Dept: PREADMISSION TESTING | Facility: HOSPITAL | Age: 70
End: 2025-02-21
Payer: MEDICARE

## 2025-02-21 ENCOUNTER — OFFICE VISIT (OUTPATIENT)
Dept: FAMILY MEDICINE CLINIC | Facility: CLINIC | Age: 70
End: 2025-02-21
Payer: MEDICARE

## 2025-02-21 VITALS
TEMPERATURE: 98 F | HEART RATE: 55 BPM | OXYGEN SATURATION: 100 % | DIASTOLIC BLOOD PRESSURE: 79 MMHG | HEIGHT: 61 IN | RESPIRATION RATE: 18 BRPM | SYSTOLIC BLOOD PRESSURE: 129 MMHG | BODY MASS INDEX: 27.41 KG/M2 | WEIGHT: 145.2 LBS

## 2025-02-21 VITALS
OXYGEN SATURATION: 99 % | BODY MASS INDEX: 27.11 KG/M2 | TEMPERATURE: 98.2 F | WEIGHT: 143.6 LBS | DIASTOLIC BLOOD PRESSURE: 68 MMHG | HEIGHT: 61 IN | HEART RATE: 66 BPM | SYSTOLIC BLOOD PRESSURE: 120 MMHG

## 2025-02-21 DIAGNOSIS — F41.1 GENERALIZED ANXIETY DISORDER: ICD-10-CM

## 2025-02-21 DIAGNOSIS — D64.9 ANEMIA, UNSPECIFIED TYPE: ICD-10-CM

## 2025-02-21 DIAGNOSIS — Z00.00 ENCOUNTER FOR MEDICARE ANNUAL WELLNESS EXAM: Primary | ICD-10-CM

## 2025-02-21 DIAGNOSIS — M81.0 OSTEOPOROSIS WITHOUT CURRENT PATHOLOGICAL FRACTURE, UNSPECIFIED OSTEOPOROSIS TYPE: ICD-10-CM

## 2025-02-21 DIAGNOSIS — E10.29 TYPE 1 DIABETES MELLITUS WITH OTHER KIDNEY COMPLICATION: ICD-10-CM

## 2025-02-21 DIAGNOSIS — N64.89 RADIAL SCAR OF BREAST: ICD-10-CM

## 2025-02-21 DIAGNOSIS — I10 PRIMARY HYPERTENSION: ICD-10-CM

## 2025-02-21 DIAGNOSIS — E78.00 HYPERCHOLESTEROLEMIA: ICD-10-CM

## 2025-02-21 LAB
BASOPHILS # BLD AUTO: 0.06 10*3/MM3 (ref 0–0.2)
BASOPHILS NFR BLD AUTO: 0.9 % (ref 0–1.5)
DEPRECATED RDW RBC AUTO: 43.6 FL (ref 37–54)
EOSINOPHIL # BLD AUTO: 0.16 10*3/MM3 (ref 0–0.4)
EOSINOPHIL NFR BLD AUTO: 2.5 % (ref 0.3–6.2)
ERYTHROCYTE [DISTWIDTH] IN BLOOD BY AUTOMATED COUNT: 12.6 % (ref 12.3–15.4)
HCT VFR BLD AUTO: 38.4 % (ref 34–46.6)
HGB BLD-MCNC: 12.2 G/DL (ref 12–15.9)
IMM GRANULOCYTES # BLD AUTO: 0.01 10*3/MM3 (ref 0–0.05)
IMM GRANULOCYTES NFR BLD AUTO: 0.2 % (ref 0–0.5)
LYMPHOCYTES # BLD AUTO: 2.36 10*3/MM3 (ref 0.7–3.1)
LYMPHOCYTES NFR BLD AUTO: 36.7 % (ref 19.6–45.3)
MCH RBC QN AUTO: 30.3 PG (ref 26.6–33)
MCHC RBC AUTO-ENTMCNC: 31.8 G/DL (ref 31.5–35.7)
MCV RBC AUTO: 95.3 FL (ref 79–97)
MONOCYTES # BLD AUTO: 0.55 10*3/MM3 (ref 0.1–0.9)
MONOCYTES NFR BLD AUTO: 8.6 % (ref 5–12)
NEUTROPHILS NFR BLD AUTO: 3.29 10*3/MM3 (ref 1.7–7)
NEUTROPHILS NFR BLD AUTO: 51.1 % (ref 42.7–76)
NRBC BLD AUTO-RTO: 0 /100 WBC (ref 0–0.2)
PLATELET # BLD AUTO: 330 10*3/MM3 (ref 140–450)
PMV BLD AUTO: 11 FL (ref 6–12)
QT INTERVAL: 426 MS
QTC INTERVAL: 422 MS
RBC # BLD AUTO: 4.03 10*6/MM3 (ref 3.77–5.28)
WBC NRBC COR # BLD AUTO: 6.43 10*3/MM3 (ref 3.4–10.8)

## 2025-02-21 PROCEDURE — 36415 COLL VENOUS BLD VENIPUNCTURE: CPT | Performed by: NURSE PRACTITIONER

## 2025-02-21 PROCEDURE — 99213 OFFICE O/P EST LOW 20 MIN: CPT | Performed by: NURSE PRACTITIONER

## 2025-02-21 PROCEDURE — 93005 ELECTROCARDIOGRAM TRACING: CPT

## 2025-02-21 PROCEDURE — 1159F MED LIST DOCD IN RCRD: CPT | Performed by: NURSE PRACTITIONER

## 2025-02-21 PROCEDURE — 3051F HG A1C>EQUAL 7.0%<8.0%: CPT | Performed by: NURSE PRACTITIONER

## 2025-02-21 PROCEDURE — 3078F DIAST BP <80 MM HG: CPT | Performed by: NURSE PRACTITIONER

## 2025-02-21 PROCEDURE — G0439 PPPS, SUBSEQ VISIT: HCPCS | Performed by: NURSE PRACTITIONER

## 2025-02-21 PROCEDURE — 1170F FXNL STATUS ASSESSED: CPT | Performed by: NURSE PRACTITIONER

## 2025-02-21 PROCEDURE — 93010 ELECTROCARDIOGRAM REPORT: CPT | Performed by: INTERNAL MEDICINE

## 2025-02-21 PROCEDURE — 1126F AMNT PAIN NOTED NONE PRSNT: CPT | Performed by: NURSE PRACTITIONER

## 2025-02-21 PROCEDURE — 85025 COMPLETE CBC W/AUTO DIFF WBC: CPT | Performed by: NURSE PRACTITIONER

## 2025-02-21 PROCEDURE — 1160F RVW MEDS BY RX/DR IN RCRD: CPT | Performed by: NURSE PRACTITIONER

## 2025-02-21 PROCEDURE — 3074F SYST BP LT 130 MM HG: CPT | Performed by: NURSE PRACTITIONER

## 2025-02-21 RX ORDER — INSULIN LISPRO 100 [IU]/ML
INJECTION, SOLUTION INTRAVENOUS; SUBCUTANEOUS
COMMUNITY

## 2025-02-21 NOTE — PATIENT INSTRUCTIONS
Continue to monitor your blood sugar once daily before a meal and record results.  Continue to monitor your blood pressure periodically and record results.  Continue to work on healthy diet and exercise.  Follow up pending lab results.  Follow up in 6 months, or sooner if problems or concerns.       Medicare Wellness  Personal Prevention Plan of Service     Date of Office Visit:    Encounter Provider:  JEN Cavazos  Place of Service:  [facility]  Patient Name: Yulia Iniguez  :  1955    As part of the Medicare Wellness portion of your visit today, we are providing you with this personalized preventive plan of services (PPPS). This plan is based upon recommendations of the [place] Preventive Services Task Force (USPSTF) and the Advisory Committee on Immunization Practices (ACIP).    This lists the preventive care services that should be considered, and provides dates of when you are due. Items listed as completed are up-to-date and do not require any further intervention.    Health Maintenance   Topic Date Due    ANNUAL WELLNESS VISIT  2024    COVID-19 Vaccine (2024- season) 2025 (Originally 2024)    INFLUENZA VACCINE  2025 (Originally 2024)    Pneumococcal Vaccine 50+ (1 of 2 - PCV) 2026 (Originally 10/3/1974)    ZOSTER VACCINE (1 of 2) 2026 (Originally 10/3/2005)    DIABETIC FOOT EXAM  2025    HEMOGLOBIN A1C  2025    LUNG CANCER SCREENING  2025    DXA SCAN  2025    LIPID PANEL  10/25/2025    URINE MICROALBUMIN-CREATININE RATIO (uACR)  10/25/2025    BMI FOLLOWUP  2026    DIABETIC EYE EXAM  2026    COLORECTAL CANCER SCREENING  2026    MAMMOGRAM  2026    TDAP/TD VACCINES (2 - Td or Tdap) 2034    HEPATITIS C SCREENING  Completed       Orders Placed This Encounter   Procedures    Vitamin D,25-Hydroxy     Order Specific Question:   Release to patient     Answer:   Routine  Release [[id number]]    Vitamin B12 & Folate     Order Specific Question:   Release to patient     Answer:   Routine Release [[id number]]    Iron     Order Specific Question:   Release to patient     Answer:   Routine Release [[id number]]    Ferritin     Order Specific Question:   Release to patient     Answer:   Routine Release [[id number]]    CBC & Differential     Order Specific Question:   Manual Differential     Answer:   No     Order Specific Question:   Release to patient     Answer:   Routine Release [[id number]]       Return in about 6 months (around 8/21/2025) for Recheck.

## 2025-02-21 NOTE — PROGRESS NOTES
Subjective   The ABCs of the Annual Wellness Visit  Medicare Wellness Visit      Yulia Iniguez is a 69 y.o. patient who presents for a Medicare Wellness Visit.    The following portions of the patient's history were reviewed and   updated as appropriate: allergies, current medications, past family history, past medical history, past social history, past surgical history, and problem list.    Compared to one year ago, the patient's physical   health is better.  Compared to one year ago, the patient's mental   health is better.    Recent Hospitalizations:  This patient has had a [facility] admission record on file within the last 365 days.  Current Medical Providers:  Patient Care Team:  Nelli Grijalva APRN as PCP - General (Family Medicine)  Maribel Zelaya APRN as Nurse Practitioner (Endocrinology)  Cruz Griffith MD as Consulting Physician (Ophthalmology)    Outpatient Medications Prior to Visit   Medication Sig Dispense Refill    Accu-Chek Softclix Lancets lancets Check 5 times a day on insulin tx, poor control, hypoglycemia. Dx: E 10.65 500 each 4    alendronate (FOSAMAX) 70 MG tablet TAKE 1 TABLET EVERY 7 DAYS WITH LARGE GLASS OF WATER 30 MIN BEFORE FIRST FOOD, DRINK, MEDS; AVOID LYING DOWN FOR 30 MIN 12 tablet 3    aspirin 81 MG EC tablet Take 1 tablet by mouth Daily.      atorvastatin (LIPITOR) 10 MG tablet Take 1 tablet by mouth Daily. 90 tablet 3    Blood Glucose Monitoring Suppl (Accu-Chek Guide) w/Device kit       Cinnamon 500 MG tablet Take  by mouth.      Continuous Blood Gluc Sensor (Dexcom G6 Sensor) Dipsense Dexcom G6 Sensors, to replace every 10 days, 3 sensors per 30 day period (NDC #[address]-61). Check 6 times a day. Dx: E 10.65 9 each 4    Continuous Blood Gluc Transmit (Dexcom G6 Transmitter) misc 1 each Every 3 (Three) Months. Dispense 1 Dexcom G6 Transmitter for each 3 month period (NDC #[address]-65). Check 6 times a day. Dx: E 10.65 1 each 4    glucose blood (Accu-Chek Guide)  "test strip Check 5 times a day on insulin tx, poor control, hypoglycemia. Dx: E 10.65 500 each 4    hydroCHLOROthiazide 25 MG tablet Take 1 tablet by mouth Daily. 90 tablet 3    Insulin Aspart (novoLOG) 100 UNIT/ML injection Use as directed for meal & correction coverage up to 35 units a day 45 mL 1    insulin glargine (Lantus) 100 UNIT/ML injection Inject 15 Units under the skin into the appropriate area as directed Daily. 30 mL 0    Insulin Syringe-Needle U-100 (BD Veo Insulin Syringe U/F) 31G X 15/64\" 0.3 ML misc For use to give insulin from vial up to 5 times a day. Can substitute based on availability and insurance. 500 each 4    lisinopril (PRINIVIL,ZESTRIL) 40 MG tablet Take 1 tablet by mouth Daily. 90 tablet 3    multivitamin with minerals tablet tablet Take 1 tablet by mouth Daily. HOLD ONE WEEK FOR SURGERY      Pyridoxine HCl (VITAMIN B6 PO) Take 1 tablet by mouth Daily.      vitamin B-12 (CYANOCOBALAMIN) 1000 MCG tablet Take 1 tablet by mouth Every Other Day.      VITAMIN D, CHOLECALCIFEROL, PO Take 1 tablet by mouth Daily.       No facility-administered medications prior to visit.     No opioid medication identified on active medication list. I have reviewed chart for other potential  high risk medication/s and harmful drug interactions in the elderly.      Aspirin is on active medication list. Aspirin use is indicated based on review of current medical condition/s. Pros and cons of this therapy have been discussed today. Benefits of this medication outweigh potential harm.  Patient has been encouraged to continue taking this medication.  .      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  ACP discussion was held with the patient during this visit. Patient does not have an advance directive, information provided.          Objective   Vitals:    25 0832   BP: 120/68   BP Location: Left arm   Patient Position: Sitting   Cuff Size: Adult   Pulse: 66   Temp: 98.2 °F (36.8 °C)   SpO2: 99%   Weight: 65.1 kg (143 lb 9.6 oz)   Height: 154 cm (60.63\")       Estimated body mass index is 27.47 kg/m² as calculated from the following:    Height as of this encounter: 154 cm (60.63\").    Weight as of this encounter: 65.1 kg (143 lb 9.6 oz).                Does the patient have evidence of cognitive impairment? No, as evidenced by mini-cog assessment, see results.    Lab Results   Component Value Date    HGBA1C 7.60 (H) 2025                                                                                                Health  Risk Assessment    Smoking Status:  Social History     Tobacco Use   Smoking Status Former    Current packs/day: 0.00    Average packs/day: 2.0 packs/day for 14.0 years (28.0 ttl pk-yrs)    Types: Cigarettes    Start date: 1970    Quit date: 10/3/2012    Years since quittin.3    Passive exposure: Never   Smokeless Tobacco Never   Tobacco Comments    previously smoked about 2 packs per day for about  35 years; did not smoke during pregnancies    Caffeine 2 Cups/day      Alcohol Consumption:  Social History     Substance and Sexual Activity   Alcohol Use Yes    Comment: social       Fall Risk Screen  STEADI Fall Risk Assessment was completed, and patient is at LOW risk for falls.Assessment completed on:2025    Depression Screening   Little interest or pleasure in doing things? Not at all   Feeling down, depressed, or hopeless? Not at all   PHQ-2 Total Score " 0      Health Habits and Functional and Cognitive Screenin/21/2025     8:00 AM   Functional & Cognitive Status   Do you have difficulty preparing food and eating? No   Do you have difficulty bathing yourself, getting dressed or grooming yourself? No   Do you have difficulty using the toilet? No   Do you have difficulty moving around from place to place? No   Do you have trouble with steps or getting out of a bed or a chair? No   Current Diet Well Balanced Diet   Dental Exam Up to date   Eye Exam Up to date   Exercise (times per week) 2 times per week   Current Exercises Include Walking   Do you need help using the phone?  No   Are you deaf or do you have serious difficulty hearing?  No   Do you need help to go to places out of walking distance? No   Do you need help shopping? No   Do you need help preparing meals?  No   Do you need help with housework?  No   Do you need help with laundry? No   Do you need help taking your medications? No   Do you need help managing money? No   Do you ever drive or ride in a car without wearing a seat belt? No   Have you felt unusual stress, anger or loneliness in the last month? No   Who do you live with? Alone   If you need help, do you have trouble finding someone available to you? No   Have you been bothered in the last four weeks by sexual problems? No   Do you have difficulty concentrating, remembering or making decisions? No           Age-appropriate Screening Schedule:  Refer to the list below for future screening recommendations based on patient's age, sex and/or medical conditions. Orders for these recommended tests are listed in the plan section. The patient has been provided with a written plan.    Health Maintenance List  Health Maintenance   Topic Date Due    ANNUAL WELLNESS VISIT  2024    COVID-19 Vaccine (2024- season) 2025 (Originally 2024)    INFLUENZA VACCINE  2025 (Originally 2024)    Pneumococcal Vaccine 50+ (1 of 2 - PCV)  02/21/2026 (Originally [birth year]3/1974)    ZOSTER VACCINE (1 of 2) 02/21/2026 (Originally 10/3/2005)    DIABETIC FOOT EXAM  07/26/2025    HEMOGLOBIN A1C  07/31/2025    LUNG CANCER SCREENING  08/23/2025    DXA SCAN  08/24/2025    LIPID PANEL  10/25/2025    URINE MICROALBUMIN-CREATININE RATIO (uACR)  10/25/2025    BMI FOLLOWUP  01/03/2026    DIABETIC EYE EXAM  01/24/2026    COLORECTAL CANCER SCREENING  06/11/2026    MAMMOGRAM  11/29/2026    TDAP/TD VACCINES (2 - Td or Tdap) 05/06/2034    HEPATITIS C SCREENING  Completed     Declines flu, COVID-19, PCV20, and Shingrix vaccines.  Repeat bone density due in August                                                                                                                                             CMS Preventative Services Quick Reference  Risk Factors Identified During Encounter  Immunizations Discussed/Encouraged: Influenza, Prevnar 20 (Pneumococcal 20-valent conjugate), Shingrix, and COVID19    The above risks/problems have been discussed with the patient.    Discussed low risk for falls and recommended avoiding throw rugs, installing grab bars in bathroom, making sure have handrails on steps, etc.    Pertinent information has been shared with the patient in the After Visit Summary.  An After Visit Summary and PPPS were made available to the patient.    Follow Up:   Next Medicare Wellness visit to be scheduled in 1 year.         Additional E&M Note during same encounter follows:  Patient has additional, significant, and separately identifiable condition(s)/problem(s) that require work above and beyond the Medicare Wellness Visit     Chief Complaint  Medicare Wellness-subsequent    Subjective   HPI  Yulia is also being seen today for additional medical problem/s.  Follow up dyspnea: pt saw cardiology and had negative stress test and Echo WNL; thinks symptoms were related to anxiety; no problems since moved out of son's home and into her own apartment; occasional  episodes of increased anxiety, but resolves with deep breathing; no recent episodes; former smoker, stopped smoking 13+ years ago, previously smoked 1.5-2 packs per day for about 30 years; saw pulmo and had PFTs and WNL; pulmo also ordered repeat CT chest 8/2024 and no nodules.     Had fracture of right patella in early May 2024; will get aching in right knee after being on feet a lot at work; wears brace and helps; ROM has improved; will get discomfort only at times; had another fall when out running with her dog and he took off running and pulled her down; tries to be careful when walking.     F/U DM1: takes Lantus daily and Humalog with meals; sees layla [facility]; last A1c 7.6%; has been monitoring sugar with Dexcom monitor; blood sugars have been running 110-150s; takes Lantus 15 units daily; added cinnamon to daily meds and blood sugar has decreased; no numbness/tingling; last eye exam at [facility] 1/2025; has upcoming cataract surgery at [facility] at end of March.     F/U HTN: takes Lisinopril and HCTZ daily; does not typically monitor BP; occasional headaches, attributes to cataracts; no orthostasis; no swelling.     F/U Hyperlipidemia: takes Atorvastatin daily; no myalgias; avoids fried foods and red meat; fasting today.     F/U osteoporosis: has been taking Fosamax weekly; no problems with medication.     F/U ADH of left breast: had lumpectomy, no cancer; sees Dr. Hamm breast surgery; had follow up and repeat breast MRI in November and then breast biopsy which noted radial scar and ductal hyperplasia; will be having breast surgery next week.    Review of Systems   Constitutional:  Negative for appetite change, chills, fatigue and fever.   HENT:  Negative for ear pain, sinus pressure, sore throat and trouble swallowing.    Eyes:  Negative for discharge and visual disturbance.   Respiratory:  Negative for cough, chest tightness and shortness of breath.    Cardiovascular:  Negative for  "chest pain and palpitations.   Gastrointestinal:  Negative for abdominal pain, blood in stool, constipation and diarrhea.   Endocrine: Negative for cold intolerance, heat intolerance and polydipsia.   Genitourinary:  Negative for dysuria and frequency.   Musculoskeletal:  Negative for arthralgias and back pain.   Skin:  Negative for rash.   Neurological:  Negative for syncope and weakness.   Hematological:  Does not bruise/bleed easily.   Psychiatric/Behavioral:  Negative for suicidal ideas.             Objective   Vital Signs:  /68 (BP Location: Left arm, Patient Position: Sitting, Cuff Size: Adult)   Pulse 66   Temp 98.2 °F (36.8 °C)   Ht 154 cm (60.63\")   Wt 65.1 kg (143 lb 9.6 oz)   SpO2 99%   BMI 27.47 kg/m²     Physical Exam  Vitals and nursing note reviewed.   Constitutional:       General: She is not in acute distress.     Appearance: She is well-developed and well-groomed. She is not diaphoretic.   HENT:      Head: Normocephalic and atraumatic.      Jaw: No tenderness or pain on movement.      Right Ear: Tympanic membrane and external ear normal. No decreased hearing noted.      Left Ear: Tympanic membrane and external ear normal. No decreased hearing noted.      Nose: Nose normal.      Right Sinus: No maxillary sinus tenderness or frontal sinus tenderness.      Left Sinus: No maxillary sinus tenderness or frontal sinus tenderness.      Mouth/Throat:      Mouth: Mucous membranes are moist.      Pharynx: No oropharyngeal exudate or posterior oropharyngeal erythema.   Eyes:      Extraocular Movements: Extraocular movements intact.      Conjunctiva/sclera: Conjunctivae normal.      Pupils: Pupils are equal, round, and reactive to light.   Neck:      Thyroid: No thyromegaly.      Vascular: No carotid bruit.      Trachea: No tracheal deviation.   Cardiovascular:      Rate and Rhythm: Normal rate and regular rhythm.      Pulses: Normal pulses.      Heart sounds: Normal heart sounds. No murmur " heard.  Pulmonary:      Effort: Pulmonary effort is normal. No respiratory distress.      Breath sounds: Normal breath sounds.   Abdominal:      General: Bowel sounds are normal.      Palpations: Abdomen is soft. There is no hepatomegaly or splenomegaly.      Tenderness: There is no abdominal tenderness. There is no guarding.   Musculoskeletal:         General: Normal range of motion.      Cervical back: Normal range of motion and neck supple. No bony tenderness.      Thoracic back: No bony tenderness.      Lumbar back: No bony tenderness.      Right lower leg: No edema.      Left lower leg: No edema.   Lymphadenopathy:      Cervical: No cervical adenopathy.   Skin:     General: Skin is warm and dry.      Findings: No rash.   Neurological:      Mental Status: She is alert and oriented to person, place, and time.      Cranial Nerves: No cranial nerve deficit.      Motor: Motor function is intact.      Coordination: Coordination normal.      Gait: Gait normal.      Deep Tendon Reflexes: Reflexes are normal and symmetric.   Psychiatric:         Mood and Affect: Mood normal.         Behavior: Behavior normal.         Thought Content: Thought content normal.         Cognition and Memory: Cognition normal.         Judgment: Judgment normal.         Lab Results   Component Value Date    WBC 5.26 07/26/2024    RBC 3.29 (L) 07/26/2024    HGB 11.6 (L) 07/26/2024    HCT 32.2 (L) 07/26/2024    MCV 97.9 (H) 07/26/2024    MCH 35.3 (H) 07/26/2024    MCHC 36.0 (H) 07/26/2024    RDW 11.9 (L) 07/26/2024    RDWSD 41.5 05/08/2024    MPV 10.9 05/08/2024     07/26/2024    NEUTRORELPCT 41.0 (L) 07/26/2024    LYMPHORELPCT 42.8 07/26/2024    MONORELPCT 12.7 (H) 07/26/2024    EOSRELPCT 2.7 07/26/2024    BASORELPCT 0.8 07/26/2024    AUTOIGPER 0.3 05/08/2024    NEUTROABS 2.16 07/26/2024    LYMPHSABS 2.25 07/26/2024    MONOSABS 0.67 07/26/2024    EOSABS 0.14 07/26/2024    BASOSABS 0.04 07/26/2024    AUTOIGNUM 0.02 05/08/2024    NRBC 0.0  07/26/2024     Lab Results   Component Value Date    GLUCOSE 156 (H) 01/31/2025    BUN 19 01/31/2025    CREATININE 1.04 (H) 01/31/2025    EGFRIFNONA 62 12/10/2021    EGFRIFAFRI 71 12/10/2021    BCR 18.3 01/31/2025    K 4.8 01/31/2025    CO2 26.1 01/31/2025    CALCIUM 9.3 01/31/2025    ALBUMIN 4.4 10/25/2024    AST 22 10/25/2024    ALT 13 10/25/2024      Lab Results   Component Value Date    CHLPL 198 10/25/2024    TRIG 40 10/25/2024     (H) 10/25/2024    VLDL 8 10/25/2024    LDL 51 10/25/2024     Lab Results   Component Value Date    TSH 2.210 10/10/2022     Lab Results   Component Value Date    HGBA1C 7.60 (H) 01/31/2025             Assessment and Plan            Encounter for Medicare annual wellness exam    Orders:    CBC & Differential    Vitamin D,25-Hydroxy    Vitamin B12 & Folate    Iron    Ferritin    Anemia, unspecified type    Orders:    CBC & Differential    Vitamin B12 & Folate    Iron    Ferritin    Osteoporosis without current pathological fracture, unspecified osteoporosis type  Continue Fosamax weekly.    Orders:    Vitamin D,25-Hydroxy    DEXA Bone Density Axial; Future    Generalized anxiety disorder  Stable off medication.         Radial scar of breast  Follow up as scheduled with breast surgery.         Type 1 diabetes mellitus with other kidney complication  Diabetes is stable.   Continue current treatment regimen.  Regular aerobic exercise.  Reminded to get yearly retinal exam.  Diabetes will be reassessed in 6 months  Continue Lantus daily and Humalog with meals.  Follow up as scheduled with endocrine.         Primary hypertension  Hypertension is stable and controlled  Continue current treatment regimen.  Regular aerobic exercise.  Ambulatory blood pressure monitoring.  Blood pressure will be reassessed in 6 months.  Continue Lisinopril and HCTZ daily.         Hypercholesterolemia  Continue Atorvastatin daily.  Continue to work on healthy diet and exercise.                 Follow Up    Return in about 6 months (around 8/21/2025) for Recheck.or sooner if problems or concerns.    Patient was given instructions and counseling regarding her condition or for health maintenance advice. Please see specific information pulled into the AVS if appropriate.

## 2025-02-21 NOTE — DISCHARGE INSTRUCTIONS
Take the following medications the morning of surgery: NONE      If you are on prescription narcotic pain medication to control your pain you may also take that medication the morning of surgery.      General Instructions:     Do not eat solid food after midnight the night before surgery.  Clear liquids day of surgery are allowed but must be stopped at least two hours before your hospital arrival time.       Allowed clear liquids      Water, sodas, and tea or coffee with no cream or milk added.       12 to 20 ounces of a clear liquid that contains carbohydrates is recommended.  If non-diabetic, have Gatorade or Powerade.  If diabetic, have G2 or Powerade Zero.     Do not have liquids red in color.  Do not consume chicken, beef, pork or vegetable broth or bouillon cubes of any variety as they are not considered clear liquids and are not allowed.      Infants may have breast milk up to four hours before surgery.  Infants drinking formula may drink formula up to six hours before surgery.   Patients who avoid smoking, chewing tobacco and alcohol for 4 weeks prior to surgery have a reduced risk of post-operative complications.  Quit smoking as many days before surgery as you can.  Do not smoke, use chewing tobacco or drink alcohol the day of surgery.   If applicable bring your C-PAP/ BI-PAP machine in with you to preop day of surgery.  Bring any papers given to you in the doctor’s office.  Wear clean comfortable clothes.  Do not wear contact lenses, false eyelashes or make-up.  Bring a case for your glasses.   Bring crutches or walker if applicable.  Remove all piercings.  Leave jewelry and any other valuables at home.  Hair extensions with metal clips must be removed prior to surgery.  The Pre-Admission Testing nurse will instruct you to bring medications if unable to obtain an accurate list in Pre-Admission Testing.    Day of surgery you will need to let the preoperative nurse know the last time you took each of your  medications.      If you were given a blood bank ID arm band remember to bring it with you the day of surgery.    Preventing a Surgical Site Infection:  For 2 to 3 days before surgery, avoid shaving with a razor because the razor can irritate skin and make it easier to develop an infection.    Any areas of open skin can increase the risk of a post-operative wound infection by allowing bacteria to enter and travel throughout the body.  Notify your surgeon if you have any skin wounds / rashes even if it is not near the expected surgical site.  The area will need assessed to determine if surgery should be delayed until it is healed.  The night prior to surgery shower using a fresh bar of anti-bacterial soap (such as Dial) and clean washcloth.  Sleep in a clean bed with clean clothing.  Do not allow pets to sleep with you.  Shower on the morning of surgery using a fresh bar of anti-bacterial soap (such as Dial) and clean washcloth.  Dry with a clean towel and dress in clean clothing.  Ask your surgeon if you will be receiving antibiotics prior to surgery.  Make sure you, your family, and all healthcare providers clean their hands with soap and water or an alcohol based hand  before caring for you or your wound.  CHLORHEXIDINE CLOTH INSTRUCTIONS  The morning of surgery follow these instructions using the Chlorhexidine cloths you've been given.  These steps reduce bacteria on the body.  Do not use the cloths near your eyes, ears mouth, genitalia or on open wounds.  Throw the cloths away after use but do not try to flush them down a toilet.      Open and remove one cloth at a time from the package.    Leave the cloth unfolded and begin the bathing.  Massage the skin with the cloths using gentle pressure to remove bacteria.  Do not scrub harshly.   Follow the steps below with one 2% CHG cloth per area (6 total cloths).  One cloth for neck, shoulders and chest.  One cloth for both arms, hands, fingers and underarms  (do underarms last).  One cloth for the abdomen followed by groin.  One cloth for right leg and foot including between the toes.  One cloth for left leg and foot including between the toes.  The last cloth is to be used for the back of the neck, back and buttocks.    Allow the CHG to air dry 3 minutes on the skin which will give it time to work and decrease the chance of irritation.  The skin may feel sticky until it is dry.  Do not rinse with water or any other liquid or you will lose the beneficial effects of the CHG.  If mild skin irritation occurs, do rinse the skin to remove the CHG.  Report this to the nurse at time of admission.  Do not apply lotions, creams, ointments, deodorants or perfumes after using the clothes. Dress in clean clothes before coming to the hospital.  Day of surgery:  Your arrival time is approximately two hours before your scheduled surgery time.  Please note if you have an early arrival time the surgery doors do not open before 5:00 AM.  Upon arrival, a Pre-op nurse and Anesthesiologist will review your health history, obtain vital signs, and answer questions you may have.  The only belongings needed at this time will be a list of your home medications and if applicable your C-PAP/BI-PAP machine.  A Pre-op nurse will start an IV and you may receive medication in preparation for surgery, including something to help you relax.     Please be aware that surgery does come with discomfort.  We want to make every effort to control your discomfort so please discuss any uncontrolled symptoms with your nurse.   Your doctor will most likely have prescribed pain medications.      If you are going home after surgery you will receive individualized written care instructions before being discharged.  A responsible adult must drive you to and from the hospital on the day of your surgery and ideally stay with you through the night.   .  Discharge prescriptions can be filled by the hospital pharmacy  during regular pharmacy hours.  If you are having surgery late in the day/evening your prescription may be e-prescribed to your pharmacy.  Please verify your pharmacy hours or chose a 24 hour pharmacy to avoid not having access to your prescription because your pharmacy has closed for the day.    If you are staying overnight following surgery, you will be transported to your hospital room following the recovery period.  [facility] has all private rooms.    If you have any questions please call Pre-Admission Testing at [phone].  Deductibles and co-payments are collected on the day of service. Please be prepared to pay the required co-pay, deductible or deposit on the day of service as defined by your plan.    Call your surgeon immediately if you experience any of the following symptoms:  Sore Throat  Shortness of Breath or difficulty breathing  Cough  Chills  Body soreness or muscle pain  Headache  Fever  New loss of taste or smell  Do not arrive for your surgery ill.  Your procedure will need to be rescheduled to another time.  You will need to call your physician before the day of surgery to avoid any unnecessary exposure to hospital staff as well as other patients.

## 2025-02-22 NOTE — ASSESSMENT & PLAN NOTE
Hypertension is stable and controlled  Continue current treatment regimen.  Regular aerobic exercise.  Ambulatory blood pressure monitoring.  Blood pressure will be reassessed in 6 months.  Continue Lisinopril and HCTZ daily.

## 2025-02-22 NOTE — ASSESSMENT & PLAN NOTE
Diabetes is stable.   Continue current treatment regimen.  Regular aerobic exercise.  Reminded to get yearly retinal exam.  Diabetes will be reassessed in 6 months  Continue Lantus daily and Humalog with meals.  Follow up as scheduled with endocrine.

## 2025-02-27 ENCOUNTER — APPOINTMENT (OUTPATIENT)
Dept: MAMMOGRAPHY | Facility: HOSPITAL | Age: 70
End: 2025-02-27
Payer: MEDICARE

## 2025-02-27 ENCOUNTER — TRANSCRIBE ORDERS (OUTPATIENT)
Dept: LAB | Facility: HOSPITAL | Age: 70
End: 2025-02-27
Payer: MEDICARE

## 2025-02-27 ENCOUNTER — APPOINTMENT (OUTPATIENT)
Dept: GENERAL RADIOLOGY | Facility: HOSPITAL | Age: 70
End: 2025-02-27
Payer: MEDICARE

## 2025-02-27 ENCOUNTER — ANESTHESIA EVENT (OUTPATIENT)
Dept: PERIOP | Facility: HOSPITAL | Age: 70
End: 2025-02-27
Payer: MEDICARE

## 2025-02-27 ENCOUNTER — HOSPITAL ENCOUNTER (OUTPATIENT)
Facility: HOSPITAL | Age: 70
Setting detail: HOSPITAL OUTPATIENT SURGERY
Discharge: HOME | End: 2025-02-27
Attending: STUDENT IN AN ORGANIZED HEALTH CARE EDUCATION/TRAINING PROGRAM | Admitting: STUDENT IN AN ORGANIZED HEALTH CARE EDUCATION/TRAINING PROGRAM
Payer: MEDICARE

## 2025-02-27 ENCOUNTER — ANESTHESIA (OUTPATIENT)
Dept: PERIOP | Facility: HOSPITAL | Age: 70
End: 2025-02-27
Payer: MEDICARE

## 2025-02-27 ENCOUNTER — ANCILLARY PROCEDURE (OUTPATIENT)
Dept: LAB | Facility: HOSPITAL | Age: 70
End: 2025-02-27
Payer: MEDICARE

## 2025-02-27 VITALS
HEART RATE: 64 BPM | RESPIRATION RATE: 18 BRPM | DIASTOLIC BLOOD PRESSURE: 69 MMHG | SYSTOLIC BLOOD PRESSURE: 130 MMHG | OXYGEN SATURATION: 98 % | TEMPERATURE: 97.9 F

## 2025-02-27 DIAGNOSIS — N64.89 RADIAL SCAR OF LEFT BREAST: Primary | ICD-10-CM

## 2025-02-27 DIAGNOSIS — N64.89 RADIAL SCAR OF LEFT BREAST: ICD-10-CM

## 2025-02-27 DIAGNOSIS — N64.89 RADIAL SCAR OF BREAST: Primary | ICD-10-CM

## 2025-02-27 LAB — GLUCOSE BLDC GLUCOMTR-MCNC: 182 MG/DL (ref 70–130)

## 2025-02-27 PROCEDURE — 25010000002 CEFAZOLIN PER 500 MG: Performed by: STUDENT IN AN ORGANIZED HEALTH CARE EDUCATION/TRAINING PROGRAM

## 2025-02-27 PROCEDURE — 25010000002 LIDOCAINE 1 % SOLUTION: Performed by: ANESTHESIOLOGY

## 2025-02-27 PROCEDURE — 25010000002 PROPOFOL 10 MG/ML EMULSION: Performed by: NURSE ANESTHETIST, CERTIFIED REGISTERED

## 2025-02-27 PROCEDURE — 19301 PARTIAL MASTECTOMY: CPT | Performed by: REGISTERED NURSE

## 2025-02-27 PROCEDURE — 82948 REAGENT STRIP/BLOOD GLUCOSE: CPT

## 2025-02-27 PROCEDURE — 25810000003 LACTATED RINGERS PER 1000 ML: Performed by: ANESTHESIOLOGY

## 2025-02-27 PROCEDURE — 25010000002 LIDOCAINE PF 2% 2 % SOLUTION: Performed by: NURSE ANESTHETIST, CERTIFIED REGISTERED

## 2025-02-27 PROCEDURE — C1819 TISSUE LOCALIZATION-EXCISION: HCPCS

## 2025-02-27 PROCEDURE — 19301 PARTIAL MASTECTOMY: CPT | Performed by: STUDENT IN AN ORGANIZED HEALTH CARE EDUCATION/TRAINING PROGRAM

## 2025-02-27 PROCEDURE — 88307 TISSUE EXAM BY PATHOLOGIST: CPT | Performed by: STUDENT IN AN ORGANIZED HEALTH CARE EDUCATION/TRAINING PROGRAM

## 2025-02-27 PROCEDURE — 76098 X-RAY EXAM SURGICAL SPECIMEN: CPT

## 2025-02-27 PROCEDURE — 25010000002 LIDOCAINE 1 % SOLUTION: Performed by: STUDENT IN AN ORGANIZED HEALTH CARE EDUCATION/TRAINING PROGRAM

## 2025-02-27 PROCEDURE — 25010000002 FENTANYL CITRATE (PF) 100 MCG/2ML SOLUTION: Performed by: NURSE ANESTHETIST, CERTIFIED REGISTERED

## 2025-02-27 RX ORDER — DIAZEPAM 5 MG/1
10 TABLET ORAL ONCE
Status: COMPLETED | OUTPATIENT
Start: 2025-02-27 | End: 2025-02-27

## 2025-02-27 RX ORDER — FENTANYL CITRATE 50 UG/ML
50 INJECTION, SOLUTION INTRAMUSCULAR; INTRAVENOUS ONCE AS NEEDED
Status: DISCONTINUED | OUTPATIENT
Start: 2025-02-27 | End: 2025-02-27 | Stop reason: HOSPADM

## 2025-02-27 RX ORDER — SODIUM CHLORIDE 0.9 % (FLUSH) 0.9 %
3 SYRINGE (ML) INJECTION EVERY 12 HOURS SCHEDULED
Status: DISCONTINUED | OUTPATIENT
Start: 2025-02-27 | End: 2025-02-27 | Stop reason: HOSPADM

## 2025-02-27 RX ORDER — LIDOCAINE HYDROCHLORIDE 20 MG/ML
INJECTION, SOLUTION EPIDURAL; INFILTRATION; INTRACAUDAL; PERINEURAL AS NEEDED
Status: DISCONTINUED | OUTPATIENT
Start: 2025-02-27 | End: 2025-02-27 | Stop reason: SURG

## 2025-02-27 RX ORDER — EPHEDRINE SULFATE 50 MG/ML
INJECTION, SOLUTION INTRAVENOUS AS NEEDED
Status: DISCONTINUED | OUTPATIENT
Start: 2025-02-27 | End: 2025-02-27 | Stop reason: SURG

## 2025-02-27 RX ORDER — DIAZEPAM 5 MG/1
10 TABLET ORAL EVERY 6 HOURS PRN
Status: DISCONTINUED | OUTPATIENT
Start: 2025-02-27 | End: 2025-02-27 | Stop reason: HOSPADM

## 2025-02-27 RX ORDER — FAMOTIDINE 10 MG/ML
20 INJECTION, SOLUTION INTRAVENOUS ONCE
Status: COMPLETED | OUTPATIENT
Start: 2025-02-27 | End: 2025-02-27

## 2025-02-27 RX ORDER — PROPOFOL 10 MG/ML
VIAL (ML) INTRAVENOUS CONTINUOUS PRN
Status: DISCONTINUED | OUTPATIENT
Start: 2025-02-27 | End: 2025-02-27 | Stop reason: SURG

## 2025-02-27 RX ORDER — LIDOCAINE HYDROCHLORIDE 10 MG/ML
3 INJECTION, SOLUTION INFILTRATION; PERINEURAL ONCE
Status: COMPLETED | OUTPATIENT
Start: 2025-02-27 | End: 2025-02-27

## 2025-02-27 RX ORDER — SODIUM CHLORIDE 0.9 % (FLUSH) 0.9 %
3-10 SYRINGE (ML) INJECTION AS NEEDED
Status: DISCONTINUED | OUTPATIENT
Start: 2025-02-27 | End: 2025-02-27 | Stop reason: HOSPADM

## 2025-02-27 RX ORDER — MIDAZOLAM HYDROCHLORIDE 1 MG/ML
0.5 INJECTION, SOLUTION INTRAMUSCULAR; INTRAVENOUS
Status: DISCONTINUED | OUTPATIENT
Start: 2025-02-27 | End: 2025-02-27 | Stop reason: HOSPADM

## 2025-02-27 RX ORDER — FENTANYL CITRATE 50 UG/ML
INJECTION, SOLUTION INTRAMUSCULAR; INTRAVENOUS AS NEEDED
Status: DISCONTINUED | OUTPATIENT
Start: 2025-02-27 | End: 2025-02-27 | Stop reason: SURG

## 2025-02-27 RX ORDER — SODIUM CHLORIDE, SODIUM LACTATE, POTASSIUM CHLORIDE, CALCIUM CHLORIDE 600; 310; 30; 20 MG/100ML; MG/100ML; MG/100ML; MG/100ML
9 INJECTION, SOLUTION INTRAVENOUS CONTINUOUS
Status: DISCONTINUED | OUTPATIENT
Start: 2025-02-27 | End: 2025-02-27 | Stop reason: HOSPADM

## 2025-02-27 RX ORDER — TRAMADOL HYDROCHLORIDE 50 MG/1
50 TABLET ORAL EVERY 6 HOURS PRN
Qty: 8 TABLET | Refills: 0 | Status: SHIPPED | OUTPATIENT
Start: 2025-02-27 | End: 2026-02-27

## 2025-02-27 RX ORDER — LIDOCAINE HYDROCHLORIDE 10 MG/ML
0.5 INJECTION, SOLUTION INFILTRATION; PERINEURAL ONCE AS NEEDED
Status: COMPLETED | OUTPATIENT
Start: 2025-02-27 | End: 2025-02-27

## 2025-02-27 RX ADMIN — LIDOCAINE HYDROCHLORIDE 0.5 ML: 10 INJECTION, SOLUTION INFILTRATION; PERINEURAL at 06:42

## 2025-02-27 RX ADMIN — FENTANYL CITRATE 25 MCG: 50 INJECTION INTRAMUSCULAR; INTRAVENOUS at 08:55

## 2025-02-27 RX ADMIN — CEFAZOLIN 2000 MG: 2 INJECTION, POWDER, FOR SOLUTION INTRAMUSCULAR; INTRAVENOUS at 08:24

## 2025-02-27 RX ADMIN — EPHEDRINE SULFATE 7.5 MG: 50 INJECTION INTRAVENOUS at 09:10

## 2025-02-27 RX ADMIN — FENTANYL CITRATE 50 MCG: 50 INJECTION INTRAMUSCULAR; INTRAVENOUS at 08:41

## 2025-02-27 RX ADMIN — LIDOCAINE HYDROCHLORIDE 40 MG: 20 INJECTION, SOLUTION EPIDURAL; INFILTRATION; INTRACAUDAL; PERINEURAL at 08:41

## 2025-02-27 RX ADMIN — FENTANYL CITRATE 25 MCG: 50 INJECTION INTRAMUSCULAR; INTRAVENOUS at 09:02

## 2025-02-27 RX ADMIN — SODIUM CHLORIDE, SODIUM LACTATE, POTASSIUM CHLORIDE, AND CALCIUM CHLORIDE 9 ML/HR: 600; 310; 30; 20 INJECTION, SOLUTION INTRAVENOUS at 08:24

## 2025-02-27 RX ADMIN — DIAZEPAM 10 MG: 5 TABLET ORAL at 06:14

## 2025-02-27 RX ADMIN — PROPOFOL 100 MCG/KG/MIN: 10 INJECTION, EMULSION INTRAVENOUS at 08:42

## 2025-02-27 RX ADMIN — LIDOCAINE HYDROCHLORIDE 3 ML: 10 INJECTION, SOLUTION INFILTRATION; PERINEURAL at 08:43

## 2025-02-27 RX ADMIN — FAMOTIDINE 20 MG: 10 INJECTION INTRAVENOUS at 08:27

## 2025-02-27 RX ADMIN — PROPOFOL 80 MG: 10 INJECTION, EMULSION INTRAVENOUS at 08:41

## 2025-02-27 NOTE — OP NOTE
OPERATIVE REPORT     DATE: 2/27/2025     SURGEON: Alanna Hamm MD      ASSISTANT: Clarice Dent, who was present for necessary suctioning, retracting, suturing throughout the procedure      OPERATION PERFORMED: Left breast wire localized excisional biopsy     PREOPERATIVE DIAGNOSIS: Radial scar of the left breast      POSTOPERATIVE DIAGNOSIS: Same     ANESTHESIA: MAC     SPECIMEN: Left breast wire localized excisional biopsy (short stitch superior, long lateral, double anterior)  Left breast additional posterior margin (stitch at true margin)   Left breast additional inferior margin (stitch at true margin)    DRAINS: None     BLOOD LOSS: Minimal    INDICATION FOR OPERATION: Mrs. Iniguez is a 69 y.o. lady who was recently diagnosed with a left breast radial scar.  Left breast wire localized excisional biopsy was recommended. All risks (including bleeding, infection, damage to surrounding structures, need for further surgery, pathologic upgrade), benefits and alternatives were explained to the patient who agreed and wished to proceed. Informed consent was signed.      OPERATIVE COURSE: The patient was taken to the operating room, transferred onto the operating room table, and underwent anesthesia without incident. The patient was prepped and draped in sterile fashion. A time out was performed and preoperative antibiotics were given.  Half percent Marcaine with epinephrine was injected into the skin and subcutaneous tissues.  A curvilinear incision was made along the nipple areolar border.  Bovie electrocautery was used to create a flap along the length of the wire 1 cm in thickness.  Tissue was transected around the tip of the wire.  Lastly the wire was brought through the incision with 2 hemostats.   The tissue was amputated at its base.  It was marked as listed above.  Specimen radiograph confirmed wire and abnormal breast tissue.  It was sent for fresh permanent specimen.  Additional posterior and inferior margins  were taken with 2 Allis clamps.  They were imaged in the anterior image showed the clip.  The area was irrigated and appeared hemostatic.  There were no signs of bleeding.  The rest of the local anesthetic was administered.  It was closed with interrupted 3-0 Vicryl sutures and a running 4-0 Monocryl suture.  Skin glue was placed over the incision.  The patient tolerated the procedure well. All needle and lap counts were correct at the end of the case. The patient was then awoken from anesthesia and taken to recovery for further monitoring.       Alanna Hamm MD   General and Endoscopic Surgery  [facility]     [address], [address]  [place], [place], [address]  P: [phone]  F: [phone]

## 2025-02-27 NOTE — ANESTHESIA POSTPROCEDURE EVALUATION
Patient: Yulia Iniguez    Procedure Summary       Date: 02/27/25 Room / Location:  [facility] /  [facility]    Anesthesia Start: 0831 Anesthesia Stop: 0932    Procedure: left breast needle-localized excisional biopsy (Left: Breast) Diagnosis:       Radial scar of breast      (Radial scar of breast [N64.89])    Surgeons: Alanna aHmm MD Provider: Tariq Fischer MD    Anesthesia Type: MAC ASA Status: 3            Anesthesia Type: MAC    Vitals  Vitals Value Taken Time   /62 02/27/25 1000   Temp 36.6 °C (97.9 °F) 02/27/25 0927   Pulse 65 02/27/25 1002   Resp     SpO2 100 % 02/27/25 1002   Vitals shown include unfiled device data.        Post Anesthesia Care and Evaluation    Patient location during evaluation: bedside  Patient participation: complete - patient participated  Level of consciousness: awake and alert  Pain management: adequate    Airway patency: patent  Anesthetic complications: No anesthetic complications    Cardiovascular status: acceptable  Respiratory status: acceptable  Hydration status: acceptable    Comments: /69 (BP Location: Right arm, Patient Position: Sitting)   Pulse 64   Temp 36.6 °C (97.9 °F) (Oral)   Resp 18   LMP  (LMP Unknown)   SpO2 98%

## 2025-02-27 NOTE — H&P
Copied text in this note has been reviewed by me and remains accurate as of 2/27/2025.    ASSESSMENT/PLAN:      69 y.o. female with:     (1) High risk screening for breast cancer:   - Lopez Rojas lifetime breast cancer risk: 21%. She does qualify for high risk screening.  - Annual mammogram due 7/2023. She has not completed this. Order placed. Will call her with the results.   - Annual MRI 3/2024. BIRADS2.  - She declines referral to medical oncology.   - Follow up in 1 year.     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     (3) Newly diagnosed left breast radial scar.   S/p MRI biopsy 11/29/2024.  She returned as a radial scar.  Discussed options including observation versus left breast wire localized excisional biopsy.  She would like to proceed with excision due to risk of pathologic upgrade.  Orders placed.  Will schedule.     (3) Other:  - Breast MRI 10/2022 noted right hepatic and right renal lesions. MRI Abdomen showed benign hemangiomas and simple renal cyst.      Chief complaint: management of breast cancer risk     HPI: Ms. Yulia Iniguez is seen at the request of No ref. provider found. The patient is a 67 year old woman being seen for a new diagnosis of left breast ADH.    This was initially detected as an imaging abnormality on routine screening. Her work-up is detailed in the breast history section below. She has had regular annual mammogram. She denies any prior history of abnormal mammograms or breast biopsies. She denies any breast lumps, pain, skin changes, or nipple discharge.  She denies any family history of breast or ovarian cancer.      1/24/2023 she presents today for follow-up.  She is overall doing well with no new issues.  Her pain is controlled.  She is getting back to her daily activities.     2/12/2024 She is here today for a follow-up. She denies any new breast  complaints - no masses, skin changes, or nipple discharge. Denies any other health updates. She has not completed her screening mammogram.       1/2/2025 She presents today for follow up.  She follows up after her biopsy returned as a radial scar.  She has no other new findings with her breast exam.  She is up-to-date with primary care and GYN.  She is up-to-date on her colonoscopy.  She did fall twice recently with her dog.     TIMELINE OF WORKUP:  7/28/2022 Bilateral Diagnostic Mammogram with US:   There are scattered areas of fibroglandular density.    There is an asymmetry in the outer anterior left breast on the CC view, which effaces with spot compression and has no correlate on additional views, consistent with superimposition of normal breast parenchyma.   There is a 0.5 cm oval mass with obscured margins in the lower inner anterior left breast. There is a persistent at least 1 cm oval mass in the outer central posterior left breast.  There is a persistent approximately 1.1 cm focal asymmetry with questioned architectural distortion in the outer central posterior left breast, approximately 1.5 cm anterior and slightly medial to the above mass.   There is a persistent approximately 0.7 cm focal asymmetry in the slightly upper outer posterior left breast, which is medial and superior to the focal asymmetry with questioned distortion.  These areas were further assessed with ultrasound. There are no suspicious calcifications in the left breast. There are no suspicious masses, calcifications, or areas of architectural distortion in the right breast.  ULTRASOUND:  Targeted sonographic evaluation of the right breast was performed from 12:00 to 4:00 and from 7:00 to 8:00 in the region of the mammographic abnormalities.   At 2:00, 8 cm from the nipple, there is a 0.4 x 0.2 x 0.7 cm benign-appearing cyst corresponding to a focal asymmetry on mammogram.   At 2:30, 8 cm from the nipple, there is a 0.3 x 0.2 x 0.3 cm  benign-appearing cyst. At 3:00, 10 cm from the nipple, there is a 0.7 x 0.5 x 0.6 cm oval complicated cyst versus solid mass with indistinct/angular margins corresponding to a mass on mammogram, which is suspicious.   At 8:00, 2 cm from the nipple, there is a 0.4 x 0.3 x 0.3 cm benign-appearing cyst corresponding to a mass on mammogram. No sonographic correlate is identified for the focal asymmetry with questioned distortion on mammogram.  IMPRESSION:  1.  Suspicious focal asymmetry with questioned distortion in the outer central posterior left breast without a sonographic correlate. Recommendfurther evaluation with stereotactic core needle biopsy.  2.  Suspicious 0.7 cm complicated cyst versus solid mass at 3:00 in the left breast. Recommend further evaluation with ultrasound-guided core needle biopsy.  3.  No mammographic evidence of malignancy in the right breast.  BI-RADS Category 4: Suspicious     9/6/2022 Left Breast stereotactic biopsy, Left breast US guided aspiration:   Targeting on the lesion in the left breast at the 5:30 position was performed. Multiple tissue specimens were obtained. A specimen radiograph was obtained demonstrating no specific abnormality  within the specimen. A barbell shaped metallic clip was placed to cierra the site. Postbiopsy mammography of the left breast demonstrates placement of a barbell shaped metallic clip at the 5:30 o'clock position without evidence for clip migration or for a postbiopsy hematoma.     The patient was taken to the ultrasound procedure suite and preliminary sonography of the left breast was performed. The lesion at the 3:00 position on the order of 10 cm from the nipple was visualized. The lesion had a more cystic appearance on the current examination without evidence for internal or peripheral vascularity. A decision was made to attempt to aspirate the lesion under sonographic guidance. The overlying skin was prepped in usual sterile fashion. Local anesthesia  was achieved with 1% lidocaine. An 18-gauge needle was inserted into the lesion under sonographic guidance. 1 cc of tea-colored fluid was aspirated. The fluid was discarded. This confirms the benign cystic nature of the lesion. The patient tolerated the procedure without evidence for complication.     The pathology result from the 5:30 o'clock biopsy has returned as focal ADH with clustered ductal cysts are involved by low-grade florid epithelial proliferation. This is concordant with imaging findings.  IMPRESSION:  Technically successful Tomosynthesis guided Mammotome vacuum assisted left breast biopsy with placement of a barbell shaped metallic clip and ultrasound-guided left breast cyst aspiration. The pathology result has returned as ADH. Surgical excision is recommended.  Pathology:   Final Diagnosis   1. Left Breast Stereotactic Biopsy:               A. Focal atypical ductal hyperplasia (ADH) involving clustered ductal cysts (see comment).               B. Fibrocystic change, clustered ductal cysts, adenosis and columnar cell change.                C. No definitive in-situ carcinoma nor invasive carcinoma identified (see comment).               D. Rare microcalcification within benign breast tissue.      10/21/2022 Bilateral Breast MRI:   IMPRESSION AND RECOMMENDATION:   1.  A biopsy clip at 4:00 in the posterior left breast marks the site of biopsy-proven atypia, for which surgical management is recommended. The biopsy clip is located at the margin of suspicious 4.2 cm nonmass enhancement, for which MR guided core needle biopsy is recommended preoperatively.  2.  No MRI evidence of malignancy in the right breast.  3.  Incompletely assessed right hepatic and right renal lesions. Recommend further evaluation with contrast-enhanced CT or MRI of the abdomen.  BI-RADS Category 4: Suspicious     12/8/2022 Left Breast MRI Guided Biopsy   IMPRESSION/RECOMMENDATIONS:  1. MRI guided biopsy of 4.3 cm nonmass enhancement  at 3:00 in the posterior left breast, marked by a stoplight clip. Pathology demonstrates possible small radial sclerosing lesion, which is high risk and concordant with the imaging assessment. Surgical management is recommended with bracketed preoperative localization targeting the barbell clip at the site of biopsy-proven left breast atypia with wide superior margins at this level and the new stoplight clip to include the bridging nonmass enhancement.     1/5/2023  Left breast wire bracketed excisional biopsy:  Final Diagnosis   1. Breast, Left, Lumpectomy: Benign breast tissue with                A. Two separate biopsy sites.               B. Two clips identified associated with each biopsy site, one being a stoplight shaped clip and one a barbell shaped clip.               C. Tissue surrounding the stoplight clip shows extensive biopsy site changes, florid ductal hyperplasia of the usual type and columnar cell change with microcalcifications in the nonneoplastic tissue.               D. Tissue surrounding the barbell clip shows biopsy site changes and columnar cell change.               E. Margins free of atypia, in situ and invasive disease.      2/6/2023 MRI Abdomen:  IMPRESSION:  1. Three hepatic lesions are delineated that are favored to represent a combination of typical and atypical hemangiomas. The right renal lesion is favored to represent a simple cyst.  2. Sliver of fluid within the left breast incompletely imaged is favored to represent postoperative change/small fluid collection.     3/15/2024 Bilateral Screening Mammogram:   FINDINGS: Bilateral digital CC and MLO mammographic and digital Tomosynthesis images were obtained. Comparison is made to prior studies dated 1/5/2023, 12/8/2022 and 7/28/2022. The parenchyma of both breasts  is largely fatty-replaced. I see no new or dominant masses, areas of architectural distortion or skin thickening. Postsurgical change of the left breast is noted. There is  no evidence for axillary lymphadenopathy  or nipple retraction.   IMPRESSION:  1. There is no evidence for malignancy or significant change in either breast. Routine followup mammography is recommended.   BI-RADS category 2: Benign.     11/2/2024 Bilateral Breast MRI  FINDINGS: The breasts are almost entirely fat. There is mild background parenchymal enhancement.  RIGHT BREAST:    No suspicious enhancing mass or area of non-mass enhancement is identified. The visualized axilla is within normal limits.   LEFT BREAST:    Benign-appearing postsurgical changes in the left breast. At 4:00 in the middle left breast, 4.3 cm posterior to the nipple, there is 1 cm AP dimension, 0.5 cm transverse dimension, 0.7 cm craniocaudal dimension non-mass enhancement, which is suspicious. The visualized axilla is within normal limits.  EXTRAMAMMARY FINDINGS:  There are no pathologically enlarged internal mammary chain lymph nodes on either side.    There is a tiny hiatal hernia.  IMPRESSION AND RECOMMENDATION:   1.  Suspicious 1 cm non-mass enhancement at 4:00 in the middle left breast. Recommend further evaluation with MRI guided core needle biopsy.  2.  No MRI evidence of malignancy in the right breast.  BI-RADS Category 4: Suspicious     11/29/2024 Left Breast MRI Guided Biopsy:   The non-mass enhancement in the outer middle left breast was targeted via a lateral approach. Using dedicated breast MRI-CAD software, the location and depth of the lesion were calculated. The overlying skin was  prepped in a sterile fashion. 1 mL of 1% lidocaine and 10 mL of 1% lidocaine with epinephrine were used to anesthetize the skin and deeper soft tissues. A nonferromagnetic sheath was placed. Subsequent imaging demonstrated adequate positioning of the sheath. A 9 gauge [facility] vacuum-assisted biopsy device  was then inserted and 8 core samples were obtained. An additional 5 mL of 1% lidocaine with epinephrine was administered during sampling.  Post-biopsy MRI images confirmed adequate sampling. An infinity clip was deployed at the biopsy site.  The patient tolerated the procedure well. During application of manual pressure for hemostasis, the biopsy clip was expelled from the breast. A repeat clip was then placed manually along the biopsy tract by the radiologist. There were no other complications. Post-procedural digital orthogonal mammographic views of the left breast  demonstrate the Infinity clip at the expected location at the site of biopsy in the lower outer middle left breast. There is an at least 3.6 cm postbiopsy hematoma.  IMPRESSION/RECOMMENDATIONS:  1. MRI guided biopsy of 1 cm non-mass enhancement in the middle left breast, marked by an infinity clip. Pathology is high risk and concordant with the imaging assessment. Recommend surgical management.      Final Diagnosis   1.  Breast, left 4 o'clock position, MRI-guided core biopsy: (Infinity clip)               A.  Radial scar with clustered cysts ,columnar cell change, apocrine metaplasia and ductal hyperplasia of the usual type.      MEDICAL HISTORY:   Gynecologic History:   . P:3 AB:1  Age at first childbirth: 19  Lactation/How long: None  Age at menarche: 13  Age at menopause: 52  Total years of oral contraceptive use: several years previously  Total years of hormone replacement therapy: none     Past Medical History:   Medical History        Past Medical History:   Diagnosis Date    Anemia      Anxiety      Cataract      CRI (chronic renal insufficiency), stage 2 (mild)       PATIENT DENIES    Essential hypertension, benign      Former smoker      History of substance abuse       DOWNERS AGE 27    Hyperlipidemia      Osteopenia       actual problem not clear and no outside DXA info    Osteoporosis without current pathological fracture      Shortness of breath       PATIENT STATES DUE TO ANXIETY    Type 2 diabetes mellitus           Past Surgical History:    Surgical History          Past Surgical History:   Procedure Laterality Date    BREAST BIOPSY   left    BREAST CYST ASPIRATION        BREAST LUMPECTOMY Left 2023     Procedure: left breast wire bracketed excisional biopsy;  Surgeon: Alanna Hamm MD;  [place] [facility] [place] [facility] [place]  Service: General;  Laterality: Left;     SECTION         x2    COLONOSCOPY         normal    HAND SURGERY         trigger finger    HERNIA REPAIR         left side of abdomen    LAPAROSCOPIC TUBAL LIGATION         X2    TONSILLECTOMY        TUBAL ABDOMINAL LIGATION        US GUIDED CYST ASPIRATION BREAST N/A 2022         Family History:          Family History   Problem Relation Age of Onset    Thyroid disease Mother           weight gain thyroid    No Known Problems Father      Thyroid disease Sister           graves disease    ADD / ADHD Son      Malig Hyperthermia Neg Hx        Social History:   Social History   Social History            Socioeconomic History    Marital status:    Tobacco Use    Smoking status: Former       Current packs/day: 0.00       Average packs/day: 2.0 packs/day for 14.0 years (28.0 ttl pk-yrs)       Types: Cigarettes       Start date: 1970       Quit date: 10/3/2012       Years since quittin.2    Smokeless tobacco: Never    Tobacco comments:       previously smoked about 2 packs per day for about  35 years; did not smoke during pregnancies       Caffeine 2 Cups/day    Vaping Use    Vaping status: Never Used   Substance and Sexual Activity    Alcohol use: Yes       Comment: social    Drug use: Never       Comment: [employer] IN [employer] AND DOWNERS AGE 27    Sexual activity: Not Currently       Birth control/protection: Tubal ligation         Allergies:   Allergies         Allergies   Allergen Reactions    Buspirone Nausea Only and Other (See Comments)       Nightmares, Edgy         Medications:     Current Medications      Current Outpatient Medications:     Accu-Chek Softclix Lancets lancets,  "Check 5 times a day on insulin tx, poor control, hypoglycemia. Dx: E 10.65, Disp: 500 each, Rfl: 4    albuterol sulfate  (90 Base) MCG/ACT inhaler, Inhale 2 puffs Every 6 (Six) Hours As Needed for Shortness of Air., Disp: 18 g, Rfl: 0    alendronate (FOSAMAX) 70 MG tablet, TAKE 1 TABLET EVERY 7 DAYS WITH LARGE GLASS OF WATER 30 MIN BEFORE FIRST FOOD, DRINK, MEDS; AVOID LYING DOWN FOR 30 MIN, Disp: 12 tablet, Rfl: 3    aspirin 81 MG EC tablet, Take 1 tablet by mouth Daily., Disp: , Rfl:     atorvastatin (LIPITOR) 10 MG tablet, Take 1 tablet by mouth Daily., Disp: 90 tablet, Rfl: 3    Blood Glucose Monitoring Suppl (Accu-Chek Guide) w/Device kit, , Disp: , Rfl:     Continuous Blood Gluc Sensor (Dexcom G6 Sensor), Dipsense Dexcom G6 Sensors, to replace every 10 days, 3 sensors per 30 day period (NDC #[address]-89). Check 6 times a day. Dx: E 10.65, Disp: 9 each, Rfl: 4    Continuous Blood Gluc Transmit (Dexcom G6 Transmitter) misc, 1 each Every 3 (Three) Months. Dispense 1 Dexcom G6 Transmitter for each 3 month period (NDC #[address]-22). Check 6 times a day. Dx: E 10.65, Disp: 1 each, Rfl: 4    glucose blood (Accu-Chek Guide) test strip, Check 5 times a day on insulin tx, poor control, hypoglycemia. Dx: E 10.65, Disp: 500 each, Rfl: 4    hydroCHLOROthiazide 25 MG tablet, Take 1 tablet by mouth Daily., Disp: 90 tablet, Rfl: 3    ibuprofen (ADVIL,MOTRIN) 800 MG tablet, Take 1 tablet by mouth Every 8 (Eight) Hours As Needed for Mild Pain or Moderate Pain., Disp: 15 tablet, Rfl: 0    Insulin Aspart (novoLOG) 100 UNIT/ML injection, Use as directed for meal & correction coverage up to 35 units a day, Disp: 45 mL, Rfl: 1    insulin glargine (Lantus) 100 UNIT/ML injection, Inject 15 Units under the skin into the appropriate area as directed Daily., Disp: 30 mL, Rfl: 0    Insulin Syringe-Needle U-100 (BD Veo Insulin Syringe U/F) 31G X 15/64\" 0.3 ML misc, For use to give insulin from vial up to 5 times a day. Can " substitute based on availability and insurance., Disp: 500 each, Rfl: 4    lisinopril (PRINIVIL,ZESTRIL) 40 MG tablet, Take 1 tablet by mouth Daily., Disp: 90 tablet, Rfl: 3    multivitamin with minerals tablet tablet, Take 1 tablet by mouth Daily. HOLD ONE WEEK FOR SURGERY, Disp: , Rfl:     Pyridoxine HCl (VITAMIN B6 PO), Take 1 tablet by mouth Daily., Disp: , Rfl:     vitamin B-12 (CYANOCOBALAMIN) 1000 MCG tablet, Take 1 tablet by mouth Every Other Day., Disp: , Rfl:     VITAMIN D, CHOLECALCIFEROL, PO, Take 1 tablet by mouth Daily., Disp: , Rfl:         Labs:    Labs from 2024 reviewed by me      ROS:   Constitutional: Negative for fevers or chills  HENT: Negative for hearing loss or runny nose  Eyes: Negative for vision changes or scleral icterus  Respiratory: Negative for cough or shortness of breath  Cardiovascular: Negative for chest pain or heart palpitations  Gastrointestinal: Negative for abdominal pain, nausea, vomiting, constipation, melena, or hematochezia  Genitourinary: Negative for hematuria or dysuria  Musculoskeletal: Negative for joint swelling or gait instability  Neurologic: Negative for tremors or seizures  Psychiatric: Negative for suicidal ideations or depression  All other systems reviewed and negative     PHYSICAL EXAM:   ECO - Asymptomatic  Constitutional: Well-developed, well-nourished, no acute distress  Eyes: Conjunctiva normal, sclera nonicteric  ENMT: Hearing grossly normal, oral mucosa moist  Neck: Supple, no palpable mass, trachea midline  Respiratory: Clear to auscultation, normal inspiratory effort  Cardiovascular: Regular rate, no murmur, no peripheral edema, no jugular venous distention  Breast:   Right: No visible abnormalities on inspection while seated, with arms raised or hands on hips. No masses, skin changes, or nipple abnormalities.  Left: No visible abnormalities on inspection while seated, with arms raised or hands on hips. No masses, skin changes, or nipple  abnormalities.  Left breast periareolar incision 1:30-4:00 well healed, no masses or skin changes. Biopsy site noted in L breast.   No clinical chest wall involvement.  Lymphatics (palpable nodes): No cervical, supraclavicular, or axillary lymphadenopathy  Skin: Warm, dry, no rash on visualized skin surfaces  Musculoskeletal: Symmetric strength, normal gait  Psychiatric: Alert and oriented ×3, normal affect      Alanna Hamm MD   [facility] - General Surgery   [address], [address]  [place] [address]     [address] [place] [address], [address]  [place] [id number]     Office: [phone]  Fax: [phone]

## 2025-02-27 NOTE — ANESTHESIA PREPROCEDURE EVALUATION
Anesthesia Evaluation     NPO Solid Status: > 8 hours  NPO Liquid Status: > 4 hours           Airway   Mallampati: I  No difficulty expected  Dental      Pulmonary    Cardiovascular   Exercise tolerance: good (4-7 METS)    (+) hypertension, hyperlipidemia      Neuro/Psych  (+) psychiatric history  GI/Hepatic/Renal/Endo    (+) renal disease-, diabetes mellitus    Musculoskeletal     Abdominal    Substance History      OB/GYN          Other                    Anesthesia Plan    ASA 3     MAC     intravenous induction     Anesthetic plan, risks, benefits, and alternatives have been provided, discussed and informed consent has been obtained with: patient.    CODE STATUS:

## 2025-02-28 LAB
CYTO UR: NORMAL
LAB AP CASE REPORT: NORMAL
LAB AP CLINICAL INFORMATION: NORMAL
LAB AP DIAGNOSIS COMMENT: NORMAL
PATH REPORT.FINAL DX SPEC: NORMAL
PATH REPORT.GROSS SPEC: NORMAL

## 2025-03-03 ENCOUNTER — TELEPHONE (OUTPATIENT)
Dept: SURGERY | Facility: CLINIC | Age: 70
End: 2025-03-03
Payer: MEDICARE

## 2025-03-03 NOTE — TELEPHONE ENCOUNTER
----- Message from Alannajoseph Hamm sent at 2/28/2025  5:21 PM EST -----  Regarding: pathology results  Can you let her know her pathology from surgery returned as benign? The abnormal tissue was completely removed and there were no other atypical cells, precancer, or cancer. She should follow up in 2 weeks.    Final Diagnosis  1.  Breast, left, lumpectomy: (7 g)               A.  Clustered cysts with columnar cell change.     2.  Breast, left posterior margin, inked and oriented excision:               A.  Benign breast parenchyma with fibroadenomatoid change; margin free of atypia, in situ and invasive malignancy.     3.  Breast, left inferior margin, inked and oriented excision:  A.  Benign breast parenchyma with Infinity clip, biopsy site change and fibroadenomatoid change; margin free of atypia, in situ and invasive malignancy.        Thanks,   Alanna Hamm

## 2025-03-03 NOTE — TELEPHONE ENCOUNTER
Attempted to call patient, left a voicemail for patient to call the office back, no verbal release for voicemail's.

## 2025-03-07 ENCOUNTER — OFFICE VISIT (OUTPATIENT)
Dept: SPORTS MEDICINE | Facility: CLINIC | Age: 70
End: 2025-03-07
Payer: MEDICARE

## 2025-03-07 VITALS
BODY MASS INDEX: 27.38 KG/M2 | WEIGHT: 145 LBS | HEART RATE: 61 BPM | TEMPERATURE: 98.2 F | RESPIRATION RATE: 16 BRPM | DIASTOLIC BLOOD PRESSURE: 76 MMHG | HEIGHT: 61 IN | SYSTOLIC BLOOD PRESSURE: 124 MMHG | OXYGEN SATURATION: 100 %

## 2025-03-07 DIAGNOSIS — S52.124D CLOSED NONDISPLACED FRACTURE OF HEAD OF RIGHT RADIUS WITH ROUTINE HEALING, SUBSEQUENT ENCOUNTER: Primary | ICD-10-CM

## 2025-03-07 NOTE — PROGRESS NOTES
"Yulia is a 69 y.o. year old female presents to [facility]    Chief Complaint   Patient presents with    Elbow Injury     Right elbow pain has improved.        History of Present Illness  History of Present Illness  The patient presents for evaluation of a right elbow fracture.    She reports a satisfactory condition of her right elbow, with no associated pain or restricted mobility. She has been cautious in avoiding heavy lifting and does not believe she has engaged in any activities that could potentially exacerbate the injury. She is almost 3 months out from the injury.    Supplemental Information  She mentions that the weather affects her knees, similar to when she fractured them, but she can walk without a limp.    I have reviewed the patient's medical, family, and social history in detail and updated the computerized patient record.    /76 (BP Location: Left arm, Patient Position: Sitting, Cuff Size: Adult)   Pulse 61   Temp 98.2 °F (36.8 °C)   Resp 16   Ht 154.9 cm (61\")   Wt 65.8 kg (145 lb)   LMP  (LMP Unknown)   SpO2 100%   BMI 27.40 kg/m²      Physical Exam    Vital signs reviewed.   General: No acute distress.  Eyes: conjunctiva clear; pupils equally round and reactive  ENT: external ears atraumatic  CV: no peripheral edema  Resp: normal respiratory effort, no use of accessory muscles  Skin: no rashes or wounds; normal turgor  Psych: mood and affect appropriate; recent and remote memory intact  Neuro: sensation to light touch intact    MSK Exam  Physical Exam  The right elbow has a full range of motion. There is no bony tenderness in the right elbow. There is no crepitus with pronation, supination of the right elbow. No pain with resisted wrist extension in the right arm.      Right Elbow X-Ray  Indication: Pain  Views: AP and Lateral views    Findings:  Fracture again demonstrated at the radial head, however there does appear to be slight widening of the " fracture gap with callus formation  no bony lesion  Normal soft tissues  Normal joint spaces    Prior studies were available for comparison.    Results  Imaging  X-ray of the right elbow shows the fracture is still visible, with some bridging callus observed.         Diagnoses and all orders for this visit:    Closed nondisplaced fracture of head of right radius with routine healing, subsequent encounter  -     XR Elbow 3+ View Right; Future  -     XR Elbow 3+ View Right  -     CT elbow right wo contrast; Future      Assessment & Plan  1. Fracture of the right elbow.  The patient reports no pain and has full range of motion in the right elbow. Examination reveals no bony tenderness, crepitus with pronation or supination, and no pain with resisted wrist extension. The fracture appears slightly more gapped than before, but there is evidence of bridging callus formation. A CT scan of the right elbow will be ordered to get a more detailed look at the fracture. She is advised to continue being cautious with the elbow.    Follow-up  The patient will follow up in 3 months.          Follow Up     Patient was given instructions and counseling regarding her condition or for health maintenance advice. Please see specific information pulled into the AVS if appropriate.     Patient or patient representative verbalized consent for the use of Ambient Listening during the visit with  WOODY Leonard Jr., DO for chart documentation. 3/7/2025  12:07 EST

## 2025-03-21 ENCOUNTER — OFFICE VISIT (OUTPATIENT)
Dept: SURGERY | Facility: CLINIC | Age: 70
End: 2025-03-21
Payer: MEDICARE

## 2025-03-21 VITALS
SYSTOLIC BLOOD PRESSURE: 140 MMHG | HEIGHT: 61 IN | DIASTOLIC BLOOD PRESSURE: 76 MMHG | WEIGHT: 177.8 LBS | HEART RATE: 60 BPM | BODY MASS INDEX: 33.57 KG/M2 | OXYGEN SATURATION: 98 %

## 2025-03-21 DIAGNOSIS — R92.8 OTHER ABNORMAL AND INCONCLUSIVE FINDINGS ON DIAGNOSTIC IMAGING OF BREAST: ICD-10-CM

## 2025-03-21 DIAGNOSIS — Z91.89 AT HIGH RISK FOR BREAST CANCER: Primary | ICD-10-CM

## 2025-03-21 PROCEDURE — 1159F MED LIST DOCD IN RCRD: CPT | Performed by: STUDENT IN AN ORGANIZED HEALTH CARE EDUCATION/TRAINING PROGRAM

## 2025-03-21 PROCEDURE — 3078F DIAST BP <80 MM HG: CPT | Performed by: STUDENT IN AN ORGANIZED HEALTH CARE EDUCATION/TRAINING PROGRAM

## 2025-03-21 PROCEDURE — 3077F SYST BP >= 140 MM HG: CPT | Performed by: STUDENT IN AN ORGANIZED HEALTH CARE EDUCATION/TRAINING PROGRAM

## 2025-03-21 PROCEDURE — 99024 POSTOP FOLLOW-UP VISIT: CPT | Performed by: STUDENT IN AN ORGANIZED HEALTH CARE EDUCATION/TRAINING PROGRAM

## 2025-03-21 PROCEDURE — 1160F RVW MEDS BY RX/DR IN RCRD: CPT | Performed by: STUDENT IN AN ORGANIZED HEALTH CARE EDUCATION/TRAINING PROGRAM

## 2025-03-21 RX ORDER — PREDNISOLONE ACETATE 10 MG/ML
SUSPENSION/ DROPS OPHTHALMIC
COMMUNITY
Start: 2025-03-14

## 2025-03-21 RX ORDER — CIPROFLOXACIN HYDROCHLORIDE 3.5 MG/ML
SOLUTION/ DROPS TOPICAL
COMMUNITY
Start: 2025-03-14

## 2025-03-21 RX ORDER — DICLOFENAC SODIUM 1 MG/ML
SOLUTION/ DROPS OPHTHALMIC
COMMUNITY
Start: 2025-03-14

## 2025-03-21 NOTE — PROGRESS NOTES
ASSESSMENT/PLAN:     69 y.o. female with:    (1) High risk screening for breast cancer:   - Lopez Rojas lifetime breast cancer risk: 21%. She does qualify for high risk screening.  -Bilateral screening mammogram due March 2025.  Will postpone due to recent surgery.  -MRI due November 2025.  -s/p left breast excisional biopsy 2/27/2025.  - She declines referral to medical oncology.   - Follow up in 1 year.    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    (3) Left breast radial scar.   - S/p left breast wire localized excisional biopsy on 2/27/2025.    - Pathology returned as benign.    (3) Other:  - Breast MRI 10/2022 noted right hepatic and right renal lesions. MRI Abdomen showed benign hemangiomas and simple renal cyst.     Chief complaint: management of breast cancer risk    HPI: Ms. Yulia Iniguez is seen at the request of No ref. provider found. The patient is a 67 year old woman being seen for a new diagnosis of left breast ADH.    This was initially detected as an imaging abnormality on routine screening. Her work-up is detailed in the breast history section below. She has had regular annual mammogram. She denies any prior history of abnormal mammograms or breast biopsies. She denies any breast lumps, pain, skin changes, or nipple discharge.  She denies any family history of breast or ovarian cancer.     1/24/2023 she presents today for follow-up.  She is overall doing well with no new issues.  Her pain is controlled.  She is getting back to her daily activities.    2/12/2024 She is here today for a follow-up. She denies any new breast complaints - no masses, skin changes, or nipple discharge. Denies any other health updates. She has not completed her screening mammogram.      1/2/2025 She presents today for follow up.  She follows up after her biopsy returned as a radial scar.  She has no  other new findings with her breast exam.  She is up-to-date with primary care and GYN.  She is up-to-date on her colonoscopy.  She did fall twice recently with her dog.    3/21/2025 she presents today for follow-up.  She is done well since surgery.  Her pain is controlled and she is back to her daily activities.  She has no concerns or complaints.    TIMELINE OF WORKUP:  7/28/2022 Bilateral Diagnostic Mammogram with US:   There are scattered areas of fibroglandular density.    There is an asymmetry in the outer anterior left breast on the CC view, which effaces with spot compression and has no correlate on additional views, consistent with superimposition of normal breast parenchyma.   There is a 0.5 cm oval mass with obscured margins in the lower inner anterior left breast. There is a persistent at least 1 cm oval mass in the outer central posterior left breast.  There is a persistent approximately 1.1 cm focal asymmetry with questioned architectural distortion in the outer central posterior left breast, approximately 1.5 cm anterior and slightly medial to the above mass.   There is a persistent approximately 0.7 cm focal asymmetry in the slightly upper outer posterior left breast, which is medial and superior to the focal asymmetry with questioned distortion.  These areas were further assessed with ultrasound. There are no suspicious calcifications in the left breast. There are no suspicious masses, calcifications, or areas of architectural distortion in the right breast.  ULTRASOUND:  Targeted sonographic evaluation of the right breast was performed from 12:00 to 4:00 and from 7:00 to 8:00 in the region of the mammographic abnormalities.   At 2:00, 8 cm from the nipple, there is a 0.4 x 0.2 x 0.7 cm benign-appearing cyst corresponding to a focal asymmetry on mammogram.   At 2:30, 8 cm from the nipple, there is a 0.3 x 0.2 x 0.3 cm benign-appearing cyst. At 3:00, 10 cm from the nipple, there is a 0.7 x 0.5 x 0.6  cm oval complicated cyst versus solid mass with indistinct/angular margins corresponding to a mass on mammogram, which is suspicious.   At 8:00, 2 cm from the nipple, there is a 0.4 x 0.3 x 0.3 cm benign-appearing cyst corresponding to a mass on mammogram. No sonographic correlate is identified for the focal asymmetry with questioned distortion on mammogram.  IMPRESSION:  1.  Suspicious focal asymmetry with questioned distortion in the outer central posterior left breast without a sonographic correlate. Recommendfurther evaluation with stereotactic core needle biopsy.  2.  Suspicious 0.7 cm complicated cyst versus solid mass at 3:00 in the left breast. Recommend further evaluation with ultrasound-guided core needle biopsy.  3.  No mammographic evidence of malignancy in the right breast.  BI-RADS Category 4: Suspicious    9/6/2022 Left Breast stereotactic biopsy, Left breast US guided aspiration:   Targeting on the lesion in the left breast at the 5:30 position was performed. Multiple tissue specimens were obtained. A specimen radiograph was obtained demonstrating no specific abnormality  within the specimen. A barbell shaped metallic clip was placed to cierra the site. Postbiopsy mammography of the left breast demonstrates placement of a barbell shaped metallic clip at the 5:30 o'clock position without evidence for clip migration or for a postbiopsy hematoma.    The patient was taken to the ultrasound procedure suite and preliminary sonography of the left breast was performed. The lesion at the 3:00 position on the order of 10 cm from the nipple was visualized. The lesion had a more cystic appearance on the current examination without evidence for internal or peripheral vascularity. A decision was made to attempt to aspirate the lesion under sonographic guidance. The overlying skin was prepped in usual sterile fashion. Local anesthesia was achieved with 1% lidocaine. An 18-gauge needle was inserted into the lesion  under sonographic guidance. 1 cc of tea-colored fluid was aspirated. The fluid was discarded. This confirms the benign cystic nature of the lesion. The patient tolerated the procedure without evidence for complication.    The pathology result from the 5:30 o'clock biopsy has returned as focal ADH with clustered ductal cysts are involved by low-grade florid epithelial proliferation. This is concordant with imaging findings.  IMPRESSION:  Technically successful Tomosynthesis guided Mammotome vacuum assisted left breast biopsy with placement of a barbell shaped metallic clip and ultrasound-guided left breast cyst aspiration. The pathology result has returned as ADH. Surgical excision is recommended.  Pathology:   Final Diagnosis   1. Left Breast Stereotactic Biopsy:               A. Focal atypical ductal hyperplasia (ADH) involving clustered ductal cysts (see comment).               B. Fibrocystic change, clustered ductal cysts, adenosis and columnar cell change.                C. No definitive in-situ carcinoma nor invasive carcinoma identified (see comment).               D. Rare microcalcification within benign breast tissue.     10/21/2022 Bilateral Breast MRI:   IMPRESSION AND RECOMMENDATION:   1.  A biopsy clip at 4:00 in the posterior left breast marks the site of biopsy-proven atypia, for which surgical management is recommended. The biopsy clip is located at the margin of suspicious 4.2 cm nonmass enhancement, for which MR guided core needle biopsy is recommended preoperatively.  2.  No MRI evidence of malignancy in the right breast.  3.  Incompletely assessed right hepatic and right renal lesions. Recommend further evaluation with contrast-enhanced CT or MRI of the abdomen.  BI-RADS Category 4: Suspicious    12/8/2022 Left Breast MRI Guided Biopsy   IMPRESSION/RECOMMENDATIONS:  1. MRI guided biopsy of 4.3 cm nonmass enhancement at 3:00 in the posterior left breast, marked by a stoplight clip. Pathology  demonstrates possible small radial sclerosing lesion, which is high risk and concordant with the imaging assessment. Surgical management is recommended with bracketed preoperative localization targeting the barbell clip at the site of biopsy-proven left breast atypia with wide superior margins at this level and the new stoplight clip to include the bridging nonmass enhancement.     1/5/2023  Left breast wire bracketed excisional biopsy:  Final Diagnosis   1. Breast, Left, Lumpectomy: Benign breast tissue with                A. Two separate biopsy sites.               B. Two clips identified associated with each biopsy site, one being a stoplight shaped clip and one a barbell shaped clip.               C. Tissue surrounding the stoplight clip shows extensive biopsy site changes, florid ductal hyperplasia of the usual type and columnar cell change with microcalcifications in the nonneoplastic tissue.               D. Tissue surrounding the barbell clip shows biopsy site changes and columnar cell change.               E. Margins free of atypia, in situ and invasive disease.     2/6/2023 MRI Abdomen:  IMPRESSION:  1. Three hepatic lesions are delineated that are favored to represent a combination of typical and atypical hemangiomas. The right renal lesion is favored to represent a simple cyst.  2. Sliver of fluid within the left breast incompletely imaged is favored to represent postoperative change/small fluid collection.    3/15/2024 Bilateral Screening Mammogram:   FINDINGS: Bilateral digital CC and MLO mammographic and digital Tomosynthesis images were obtained. Comparison is made to prior studies dated 1/5/2023, 12/8/2022 and 7/28/2022. The parenchyma of both breasts  is largely fatty-replaced. I see no new or dominant masses, areas of architectural distortion or skin thickening. Postsurgical change of the left breast is noted. There is no evidence for axillary lymphadenopathy  or nipple retraction.    IMPRESSION:  1. There is no evidence for malignancy or significant change in either breast. Routine followup mammography is recommended.   BI-RADS category 2: Benign.    11/2/2024 Bilateral Breast MRI  FINDINGS: The breasts are almost entirely fat. There is mild background parenchymal enhancement.  RIGHT BREAST:    No suspicious enhancing mass or area of non-mass enhancement is identified. The visualized axilla is within normal limits.   LEFT BREAST:    Benign-appearing postsurgical changes in the left breast. At 4:00 in the middle left breast, 4.3 cm posterior to the nipple, there is 1 cm AP dimension, 0.5 cm transverse dimension, 0.7 cm craniocaudal dimension non-mass enhancement, which is suspicious. The visualized axilla is within normal limits.  EXTRAMAMMARY FINDINGS:  There are no pathologically enlarged internal mammary chain lymph nodes on either side.    There is a tiny hiatal hernia.  IMPRESSION AND RECOMMENDATION:   1.  Suspicious 1 cm non-mass enhancement at 4:00 in the middle left breast. Recommend further evaluation with MRI guided core needle biopsy.  2.  No MRI evidence of malignancy in the right breast.  BI-RADS Category 4: Suspicious    11/29/2024 Left Breast MRI Guided Biopsy:   The non-mass enhancement in the outer middle left breast was targeted via a lateral approach. Using dedicated breast MRI-CAD software, the location and depth of the lesion were calculated. The overlying skin was  prepped in a sterile fashion. 1 mL of 1% lidocaine and 10 mL of 1% lidocaine with epinephrine were used to anesthetize the skin and deeper soft tissues. A nonferromagnetic sheath was placed. Subsequent imaging demonstrated adequate positioning of the sheath. A 9 gauge [facility] vacuum-assisted biopsy device  was then inserted and 8 core samples were obtained. An additional 5 mL of 1% lidocaine with epinephrine was administered during sampling. Post-biopsy MRI images confirmed adequate sampling. An infinity clip  was deployed at the biopsy site.  The patient tolerated the procedure well. During application of manual pressure for hemostasis, the biopsy clip was expelled from the breast. A repeat clip was then placed manually along the biopsy tract by the radiologist. There were no other complications. Post-procedural digital orthogonal mammographic views of the left breast  demonstrate the Infinity clip at the expected location at the site of biopsy in the lower outer middle left breast. There is an at least 3.6 cm postbiopsy hematoma.  IMPRESSION/RECOMMENDATIONS:  1. MRI guided biopsy of 1 cm non-mass enhancement in the middle left breast, marked by an infinity clip. Pathology is high risk and concordant with the imaging assessment. Recommend surgical management.     Final Diagnosis   1.  Breast, left 4 o'clock position, MRI-guided core biopsy: (Infinity clip)               A.  Radial scar with clustered cysts ,columnar cell change, apocrine metaplasia and ductal hyperplasia of the usual type.     2025 Left breast wire localized excisional biopsy   Final Diagnosis   1.  Breast, left, lumpectomy: (7 g)               A.  Clustered cysts with columnar cell change.     2.  Breast, left posterior margin, inked and oriented excision:               A.  Benign breast parenchyma with fibroadenomatoid change; margin free of atypia, in situ and invasive malignancy.     3.  Breast, left inferior margin, inked and oriented excision:  A.  Benign breast parenchyma with Infinity clip, biopsy site change and fibroadenomatoid change; margin free of atypia, in situ and invasive malignancy.       MEDICAL HISTORY:   Gynecologic History:   . P:3 AB:1  Age at first childbirth: 19  Lactation/How long: None  Age at menarche: 13  Age at menopause: 52  Total years of oral contraceptive use: several years previously  Total years of hormone replacement therapy: none    Past Medical History:    Anemia  Anxiety  Cataracts  Hypertension  Hyperlipidemia  Osteopenia  Type 2 diabetes mellitus    Past Surgical History:    Left breast biopsy  Breast cyst aspiration,   Left breast lumpectomy,  and    section x2  Hand surgery  Hernia repair  Laparoscopic tubal ligation  Tonsillectomy    Family History:    As above    Social History:   Former smoker, quit   Occasional alcohol use    Allergies:   Allergies   Allergen Reactions    Buspirone Nausea Only and Other (See Comments)     Nightmares, Edgy     Medications:     Current Outpatient Medications:     Accu-Chek Softclix Lancets lancets, Check 5 times a day on insulin tx, poor control, hypoglycemia. Dx: E 10.65, Disp: 500 each, Rfl: 4    alendronate (FOSAMAX) 70 MG tablet, TAKE 1 TABLET EVERY 7 DAYS WITH LARGE GLASS OF WATER 30 MIN BEFORE FIRST FOOD, DRINK, MEDS; AVOID LYING DOWN FOR 30 MIN (Patient taking differently: Take 1 tablet by mouth Every 7 (Seven) Days. TAKE 1 TABLET EVERY 7 DAYS WITH LARGE GLASS OF WATER 30 MIN BEFORE FIRST FOOD, DRINK, MEDS; AVOID LYING DOWN FOR 30 MIN), Disp: 12 tablet, Rfl: 3    aspirin 81 MG EC tablet, Take 1 tablet by mouth Daily. HELD, Disp: , Rfl:     atorvastatin (LIPITOR) 10 MG tablet, Take 1 tablet by mouth Daily., Disp: 90 tablet, Rfl: 3    Blood Glucose Monitoring Suppl (Accu-Chek Guide) w/Device kit, , Disp: , Rfl:     Calcium Carbonate-Vitamin D (CALCIUM-VITAMIN D PO), Take 1 tablet by mouth Every Morning. HELD, Disp: , Rfl:     Cinnamon 500 MG tablet, Take 500 mg by mouth Every Morning. HELD, Disp: , Rfl:     Continuous Blood Gluc Sensor (Dexcom G6 Sensor), Dipsense Dexcom G6 Sensors, to replace every 10 days, 3 sensors per 30 day period (NDC #[address]-26). Check 6 times a day. Dx: E 10.65, Disp: 9 each, Rfl: 4    Continuous Blood Gluc Transmit (Dexcom G6 Transmitter) misc, 1 each Every 3 (Three) Months. Dispense 1 Dexcom G6 Transmitter for each 3 month period (NDC #[address]-10). Check 6 times a  "day. Dx: E 10.65, Disp: 1 each, Rfl: 4    GLUCOSAMINE-CHONDROITIN PO, Take 1 tablet by mouth Every Morning. HELD, Disp: , Rfl:     glucose blood (Accu-Chek Guide) test strip, Check 5 times a day on insulin tx, poor control, hypoglycemia. Dx: E 10.65, Disp: 500 each, Rfl: 4    hydroCHLOROthiazide 25 MG tablet, Take 1 tablet by mouth Daily., Disp: 90 tablet, Rfl: 3    insulin glargine (Lantus) 100 UNIT/ML injection, Inject 15 Units under the skin into the appropriate area as directed Daily. (Patient taking differently: Inject 15 Units under the skin into the appropriate area as directed Every Morning.), Disp: 30 mL, Rfl: 0    Insulin Lispro (humaLOG) 100 UNIT/ML injection, Inject  under the skin into the appropriate area as directed 3 (Three) Times a Day Before Meals. SLIDING SCALE, Disp: , Rfl:     Insulin Syringe-Needle U-100 (BD Veo Insulin Syringe U/F) 31G X 15/64\" 0.3 ML misc, For use to give insulin from vial up to 5 times a day. Can substitute based on availability and insurance., Disp: 500 each, Rfl: 4    lisinopril (PRINIVIL,ZESTRIL) 40 MG tablet, Take 1 tablet by mouth Daily., Disp: 90 tablet, Rfl: 3    Multiple Vitamins-Minerals (ZINC PO), Take 1 tablet by mouth Every Morning. HELD, Disp: , Rfl:     multivitamin with minerals tablet tablet, Take 1 tablet by mouth Daily. HOLD ONE WEEK FOR SURGERY, Disp: , Rfl:     Omega-3 Fatty Acids (FISH OIL PO), Take 1 capsule by mouth Every Morning. HELD, Disp: , Rfl:     Pyridoxine HCl (VITAMIN B6 PO), Take 1 tablet by mouth Daily. HELD, Disp: , Rfl:     traMADol (Ultram) 50 MG tablet, Take 1 tablet by mouth Every 6 (Six) Hours As Needed for Moderate Pain., Disp: 8 tablet, Rfl: 0    vitamin B-12 (CYANOCOBALAMIN) 1000 MCG tablet, Take 1 tablet by mouth Every Other Day., Disp: , Rfl:     Labs:    Labs from 2/21/2025 reviewed by me     ROS:   Constitutional: Negative for fevers or chills  HENT: Negative for hearing loss or runny nose  Eyes: Negative for vision changes or " scleral icterus  Respiratory: Negative for cough or shortness of breath  Cardiovascular: Negative for chest pain or heart palpitations  Gastrointestinal: Negative for abdominal pain, nausea, vomiting, constipation, melena, or hematochezia  Genitourinary: Negative for hematuria or dysuria  Musculoskeletal: Negative for joint swelling or gait instability  Neurologic: Negative for tremors or seizures  Psychiatric: Negative for suicidal ideations or depression  All other systems reviewed and negative    PHYSICAL EXAM:   ECO - Asymptomatic  Constitutional: Well-developed, well-nourished, no acute distress  Eyes: Conjunctiva normal, sclera nonicteric  ENMT: Hearing grossly normal, oral mucosa moist  Neck: Supple, no palpable mass, trachea midline  Respiratory: Clear to auscultation, normal inspiratory effort  Cardiovascular: Regular rate, no murmur, no peripheral edema, no jugular venous distention  Breast:   Right: No visible abnormalities on inspection while seated, with arms raised or hands on hips. No masses, skin changes, or nipple abnormalities.  Left: No visible abnormalities on inspection while seated, with arms raised or hands on hips. No masses, skin changes, or nipple abnormalities.  Left breast periareolar incision 1:30-4:00 well healed, no masses or skin changes.   No clinical chest wall involvement.  Lymphatics (palpable nodes): No cervical, supraclavicular, or axillary lymphadenopathy  Skin: Warm, dry, no rash on visualized skin surfaces  Musculoskeletal: Symmetric strength, normal gait  Psychiatric: Alert and oriented ×3, normal affect     Alanna Hamm MD   [facility] - General Surgery   [address], [address]  [place] [address]    [address], [address]  [place] [address]    Office: [phone]  Fax: [phone]

## 2025-05-28 DIAGNOSIS — E78.2 MIXED HYPERLIPIDEMIA: ICD-10-CM

## 2025-05-28 DIAGNOSIS — M81.0 OSTEOPOROSIS WITHOUT CURRENT PATHOLOGICAL FRACTURE, UNSPECIFIED OSTEOPOROSIS TYPE: ICD-10-CM

## 2025-05-28 DIAGNOSIS — I10 PRIMARY HYPERTENSION: ICD-10-CM

## 2025-05-28 RX ORDER — ALENDRONATE SODIUM 70 MG/1
TABLET ORAL
Qty: 12 TABLET | Refills: 1 | Status: SHIPPED | OUTPATIENT
Start: 2025-05-28

## 2025-05-28 RX ORDER — HYDROCHLOROTHIAZIDE 25 MG/1
25 TABLET ORAL DAILY
Qty: 90 TABLET | Refills: 1 | Status: SHIPPED | OUTPATIENT
Start: 2025-05-28

## 2025-05-28 RX ORDER — ATORVASTATIN CALCIUM 10 MG/1
10 TABLET, FILM COATED ORAL DAILY
Qty: 90 TABLET | Refills: 1 | Status: SHIPPED | OUTPATIENT
Start: 2025-05-28

## 2025-05-28 RX ORDER — LISINOPRIL 40 MG/1
40 TABLET ORAL DAILY
Qty: 90 TABLET | Refills: 1 | Status: SHIPPED | OUTPATIENT
Start: 2025-05-28

## 2025-06-06 ENCOUNTER — OFFICE VISIT (OUTPATIENT)
Dept: ENDOCRINOLOGY | Age: 70
End: 2025-06-06
Payer: MEDICARE

## 2025-06-06 VITALS
TEMPERATURE: 98.1 F | DIASTOLIC BLOOD PRESSURE: 60 MMHG | WEIGHT: 145 LBS | HEART RATE: 54 BPM | HEIGHT: 61 IN | SYSTOLIC BLOOD PRESSURE: 124 MMHG | OXYGEN SATURATION: 100 % | BODY MASS INDEX: 27.38 KG/M2

## 2025-06-06 DIAGNOSIS — E78.00 HYPERCHOLESTEROLEMIA: ICD-10-CM

## 2025-06-06 DIAGNOSIS — N18.2 CRI (CHRONIC RENAL INSUFFICIENCY), STAGE 2 (MILD): ICD-10-CM

## 2025-06-06 DIAGNOSIS — E10.65 TYPE 1 DIABETES MELLITUS WITH HYPERGLYCEMIA: Primary | ICD-10-CM

## 2025-06-06 LAB
ALBUMIN SERPL-MCNC: 4.5 G/DL (ref 3.5–5.2)
ALBUMIN/GLOB SERPL: 2 G/DL
ALP SERPL-CCNC: 61 U/L (ref 39–117)
ALT SERPL-CCNC: 8 U/L (ref 1–33)
AST SERPL-CCNC: 22 U/L (ref 1–32)
BILIRUB SERPL-MCNC: 0.5 MG/DL (ref 0–1.2)
BUN SERPL-MCNC: 16 MG/DL (ref 8–23)
BUN/CREAT SERPL: 16.2 (ref 7–25)
CALCIUM SERPL-MCNC: 10 MG/DL (ref 8.6–10.5)
CHLORIDE SERPL-SCNC: 103 MMOL/L (ref 98–107)
CHOLEST SERPL-MCNC: 227 MG/DL (ref 0–200)
CO2 SERPL-SCNC: 23.9 MMOL/L (ref 22–29)
CREAT SERPL-MCNC: 0.99 MG/DL (ref 0.57–1)
EGFRCR SERPLBLD CKD-EPI 2021: 61.9 ML/MIN/1.73
GLOBULIN SER CALC-MCNC: 2.2 GM/DL
GLUCOSE SERPL-MCNC: 52 MG/DL (ref 65–99)
HBA1C MFR BLD: 7.2 % (ref 4.8–5.6)
HDLC SERPL-MCNC: 149 MG/DL (ref 40–60)
IMP & REVIEW OF LAB RESULTS: NORMAL
LDLC SERPL CALC-MCNC: 69 MG/DL (ref 0–100)
POTASSIUM SERPL-SCNC: 4.5 MMOL/L (ref 3.5–5.2)
PROT SERPL-MCNC: 6.7 G/DL (ref 6–8.5)
SODIUM SERPL-SCNC: 140 MMOL/L (ref 136–145)
TRIGL SERPL-MCNC: 49 MG/DL (ref 0–150)
VLDLC SERPL CALC-MCNC: 9 MG/DL (ref 5–40)

## 2025-06-06 NOTE — PROGRESS NOTES
"Chief Complaint  Diabetes    Subjective        Yulia Iniguez presents to [facility] ENDOCRINOLOGY  History of Present Illness    Stress levels are effecting her glucose control   Omnipod was too much for her at the time     Type 1 dm, 1982   DM regimen: (Vials) lantus 15u QAM, lispro 1u:10g/carbs and 1u:20g/carbs for bedtime snack, has been given ~4u correction doses for hyperglycemia which is too aggressive without eating    Has been overcorrecting hyperglycemia leading to hypoglycemia at times  renal appt still on hold r/t cost  Last eye exam: 1/2025  Renal: lisinopril 40mg QD  CV: atorvastatin 10mg QD     reports cinnamon supplement helps her insulin work better       Objective   Vital Signs:  /60   Pulse 54   Temp 98.1 °F (36.7 °C) (Oral)   Ht 154 cm (60.63\")   Wt 65.8 kg (145 lb)   SpO2 100%   BMI 27.73 kg/m²   Estimated body mass index is 27.73 kg/m² as calculated from the following:    Height as of this encounter: 154 cm (60.63\").    Weight as of this encounter: 65.8 kg (145 lb).            Physical Exam  Vitals reviewed.   Constitutional:       General: She is not in acute distress.  HENT:      Head: Normocephalic and atraumatic.   Cardiovascular:      Rate and Rhythm: Normal rate.   Pulmonary:      Effort: Pulmonary effort is normal. No respiratory distress.   Musculoskeletal:         General: No signs of injury. Normal range of motion.      Cervical back: Normal range of motion and neck supple.   Skin:     General: Skin is warm and dry.   Neurological:      Mental Status: She is alert and oriented to person, place, and time. Mental status is at baseline.   Psychiatric:         Mood and Affect: Mood normal.         Behavior: Behavior normal.         Thought Content: Thought content normal.         Judgment: Judgment normal.        Result Review :  The following data was reviewed by: JEN Agrawal on 06/06/2025:  Common labs          11/29/2024    09:17 1/31/2025    " 11:48 2/21/2025    10:32   Common Labs   Glucose  156     BUN  19     Creatinine 1.10  1.04     Sodium  139     Potassium  4.8     Chloride  103     Calcium  9.3     WBC   6.43    Hemoglobin   12.2    Hematocrit   38.4    Platelets   330    Hemoglobin A1C  7.60                 Assessment and Plan   Diagnoses and all orders for this visit:    1. Type 1 diabetes mellitus with hyperglycemia (Primary)  -     Lipid Panel  -     Hemoglobin A1c  -     Comprehensive Metabolic Panel    2. Hypercholesterolemia    3. CRI (chronic renal insufficiency), stage 2 (mild)             Follow Up   Return in about 4 months (around 10/6/2025).    Holding on insulin delivery device at this time with current financial obligations  Labs today  Decrease aggressiveness with hyperglycemia to reduce hypoglycemia complications  Additional recommendations to follow as needed based on lab results     Patient was given instructions and counseling regarding her condition or for health maintenance advice. Please see specific information pulled into the AVS if appropriate.     JEN Agrawal

## 2025-06-11 ENCOUNTER — TREATMENT (OUTPATIENT)
Dept: ENDOCRINOLOGY | Age: 70
End: 2025-06-11
Payer: MEDICARE

## 2025-06-11 DIAGNOSIS — E10.65 TYPE 1 DIABETES MELLITUS WITH HYPERGLYCEMIA: Primary | ICD-10-CM

## 2025-06-11 PROCEDURE — 95251 CONT GLUC MNTR ANALYSIS I&R: CPT | Performed by: NURSE PRACTITIONER

## 2025-06-11 NOTE — PROGRESS NOTES
Cgm reviewed 5/24/25-6/6/25  Avg glu 179  GMI 7.6%  18% very high  31% high  45% time in range  6% low     Time in range not at goal with noted hypoglycemia  Decrease correction dose when not eating by 50% to prevent hypoglycemia   Improve meal time insulin dose timing to reduce PP hyperglycemia   A1c improving, favorable range for patients age

## 2025-06-20 DIAGNOSIS — E10.65 TYPE 1 DIABETES MELLITUS WITH HYPERGLYCEMIA: ICD-10-CM

## 2025-06-20 NOTE — TELEPHONE ENCOUNTER
"  Caller: Yulia Iniguez \"Luciana\"    Relationship: Self    Best call back number:     [phone]        Requested Prescriptions:   Requested Prescriptions     Pending Prescriptions Disp Refills    Insulin Lispro (humaLOG) 100 UNIT/ML injection       Sig: Inject  under the skin into the appropriate area as directed 3 (Three) Times a Day Before Meals. SLIDING SCALE    insulin glargine (Lantus) 100 UNIT/ML injection 30 mL 0     Sig: Inject 15 Units under the skin into the appropriate area as directed Daily.        Pharmacy where request should be sent: [id number] [phone] [facility] [phone] [facility]     Last office visit with prescribing clinician: 2/21/2025   Last telemedicine visit with prescribing clinician: Visit date not found   Next office visit with prescribing clinician: 8/22/2025     Additional details provided by patient: PLEASE PLACE A STAT/RUSH    Does the patient have less than a 3 day supply:  [] Yes  [] No    Would you like a call back once the refill request has been completed: [] Yes [] No    If the office needs to give you a call back, can they leave a voicemail: [] Yes [] No    Rashid Martino   06/20/25 10:30 EDT            "

## 2025-06-20 NOTE — TELEPHONE ENCOUNTER
I called and spoke with the patient to ask her about this medicine and she said they sent the message to the wrong office. She said it was supposed to be sent to her endocrinologist office. She said she is almost out and doesn't know what to do. I let her know I could send it to [facility] but that we have not prescribed it so I was unsure of if we could get this filled. She verbalized understanding and said she just wanted it sent to her endocrinologist office. I let her know I would send this directly to her providers basket. I advised her our office closes at three but that she can call and someone is on call if needed. She verbalized understanding and will call back if needed.

## 2025-06-23 RX ORDER — INSULIN LISPRO 100 [IU]/ML
INJECTION, SOLUTION INTRAVENOUS; SUBCUTANEOUS
Qty: 30 ML | Refills: 0 | Status: SHIPPED | OUTPATIENT
Start: 2025-06-23

## 2025-06-23 RX ORDER — INSULIN GLARGINE 100 [IU]/ML
15 INJECTION, SOLUTION SUBCUTANEOUS DAILY
Qty: 30 ML | Refills: 0 | Status: SHIPPED | OUTPATIENT
Start: 2025-06-23 | End: 2025-06-26 | Stop reason: SDUPTHER

## 2025-06-26 DIAGNOSIS — E10.65 TYPE 1 DIABETES MELLITUS WITH HYPERGLYCEMIA: ICD-10-CM

## 2025-06-26 RX ORDER — INSULIN GLARGINE 100 [IU]/ML
15 INJECTION, SOLUTION SUBCUTANEOUS DAILY
Qty: 30 ML | Refills: 0 | Status: SHIPPED | OUTPATIENT
Start: 2025-06-26 | End: 2025-06-26 | Stop reason: SDUPTHER

## 2025-06-26 RX ORDER — INSULIN GLARGINE 100 [IU]/ML
15 INJECTION, SOLUTION SUBCUTANEOUS DAILY
Qty: 30 ML | Refills: 0 | Status: SHIPPED | OUTPATIENT
Start: 2025-06-26

## 2025-07-02 ENCOUNTER — SPECIALTY PHARMACY (OUTPATIENT)
Dept: ENDOCRINOLOGY | Age: 70
End: 2025-07-02
Payer: MEDICARE

## 2025-07-02 ENCOUNTER — TELEPHONE (OUTPATIENT)
Dept: ENDOCRINOLOGY | Age: 70
End: 2025-07-02
Payer: MEDICARE

## 2025-07-02 ENCOUNTER — TELEPHONE (OUTPATIENT)
Dept: ENDOCRINOLOGY | Age: 70
End: 2025-07-02

## 2025-07-02 DIAGNOSIS — E10.65 TYPE 1 DIABETES MELLITUS WITH HYPERGLYCEMIA: ICD-10-CM

## 2025-07-02 RX ORDER — INSULIN GLARGINE 100 [IU]/ML
15 INJECTION, SOLUTION SUBCUTANEOUS DAILY
Qty: 15 ML | Refills: 0 | Status: SHIPPED | OUTPATIENT
Start: 2025-07-02 | End: 2025-07-02 | Stop reason: SDUPTHER

## 2025-07-02 RX ORDER — INSULIN GLARGINE 100 [IU]/ML
15 INJECTION, SOLUTION SUBCUTANEOUS DAILY
Qty: 20 ML | Refills: 0 | Status: SHIPPED | OUTPATIENT
Start: 2025-07-02 | End: 2025-07-07 | Stop reason: SDUPTHER

## 2025-07-02 NOTE — TELEPHONE ENCOUNTER
Rx Refill Note  Requested Prescriptions     Pending Prescriptions Disp Refills    insulin glargine (Lantus) 100 UNIT/ML injection 30 mL 0     Sig: Inject 15 Units under the skin into the appropriate area as directed Daily.      Last office visit with prescribing clinician: 6/6/2025   Last telemedicine visit with prescribing clinician: Visit date not found   Next office visit with prescribing clinician: 10/10/2025                         Would you like a call back once the refill request has been completed: [] Yes [] No    If the office needs to give you a call back, can they leave a voicemail: [] Yes [] No    Raquel Odom MA  07/02/25, 13:01 EDT     Patient states she needs two vials

## 2025-07-02 NOTE — TELEPHONE ENCOUNTER
"      Caller: Yulia Iniguez \"Luciana\"     Relationship: Self     Best call back number: [phone]      Who is your current provider: MEREDITH DAVID     Is your current provider offboarding? NO     Who would you like your new provider to be: DR. PRICE      What are your reasons for transferring care: PT DOESN'T WANT MERED[facility] ANYMORE. PT WANTS TO SEE DR PRICE. PLEASE CALL TO ADVISE AND SCHEDULE IF APPROVED.      Additional notes: N/A        "

## 2025-07-02 NOTE — TELEPHONE ENCOUNTER
"    Caller: Yulia Iniguez \"Luciana\"    Relationship: Self    Best call back number: [phone]     Who is your current provider: MEREDITH DAVID    Is your current provider offboarding? NO    Who would you like your new provider to be: DR. PRICE     What are your reasons for transferring care: PT DOESN'T WANT MEREDITH ANYMORE.     Additional notes: N/A         "

## 2025-07-02 NOTE — PROGRESS NOTES
Called and spoke with patient to understand that she was dispensed a box of Lantus pens when she typically fills vials. RX was written for vials.     Called [facility] and spoke with technician named Nevin, who confirmed RX was written for vials but pens were dispensed. She stated that she will report error and someone from [facility] will call the patient in 3-5 business days.    Information was given to patient who was satisfied with response. Patient did get 1 vial, [place] [facility] She is now requesting that new RX be sent to [facility] to continue treatment.     Anisa Duncan, PharmD, MM   Clinical Speciality Pharmacist, Rheumatology   7/2/2025  14:10 EDT

## 2025-07-03 ENCOUNTER — TELEPHONE (OUTPATIENT)
Dept: ENDOCRINOLOGY | Age: 70
End: 2025-07-03
Payer: MEDICARE

## 2025-07-03 NOTE — TELEPHONE ENCOUNTER
Patient had an issue with RX that was filled at [facility]. RX was sent in correctly by provider and filled incorrectly by the pharmacy. Patient came in Friday, June 27, 2025 and a copy of the RX was given to patient. A new RX was called into [facility] so patient could  insulin to prevent lapse in care. Patient confirmed that she did get medication. On 7/2, confirmed with [facility] that patient was sent insulin pens and not vials as written. [facility] representative documented error and stated they would reach out to the patient in 3-5 business days to address. Another RX was sent to [facility] for vials for next fill. Patient requested to switch providers in the same office. Patient informed she is unable to switch providers and stated she will receive care at a new office and appointment was cancelled.

## 2025-07-07 DIAGNOSIS — E10.65 TYPE 1 DIABETES MELLITUS WITH HYPERGLYCEMIA: ICD-10-CM

## 2025-07-07 RX ORDER — INSULIN GLARGINE 100 [IU]/ML
15 INJECTION, SOLUTION SUBCUTANEOUS DAILY
Qty: 20 ML | Refills: 0 | Status: SHIPPED | OUTPATIENT
Start: 2025-07-07

## 2025-07-16 ENCOUNTER — TELEPHONE (OUTPATIENT)
Dept: FAMILY MEDICINE CLINIC | Facility: CLINIC | Age: 70
End: 2025-07-16
Payer: MEDICARE

## 2025-07-16 NOTE — TELEPHONE ENCOUNTER
OKAY FOR HUB TO RELAY    I tried calling and speaking with the patient but did not get an answer. There was clear identification on the voicemail so I left a detailed message letting her know we had received some paperwork for her over a medical exam and that [facility] had filled it out. I let her know I was calling to see what she would like us to do with it wether that be her coming to pick it up, us fax it somewhere or mail it out to her. I advised her to call back and let us know how she would like to proceed.    If she calls back please reiterate this message and ask how she would like to receive this paper. Let us know if she has any questions or concerns.

## 2025-08-21 ENCOUNTER — EXTERNAL PBMM DATA (OUTPATIENT)
Dept: PHARMACY | Facility: OTHER | Age: 70
End: 2025-08-21
Payer: MEDICARE

## 2025-08-22 ENCOUNTER — OFFICE VISIT (OUTPATIENT)
Dept: FAMILY MEDICINE CLINIC | Facility: CLINIC | Age: 70
End: 2025-08-22
Payer: MEDICARE

## 2025-08-22 VITALS
SYSTOLIC BLOOD PRESSURE: 122 MMHG | TEMPERATURE: 97.8 F | HEIGHT: 61 IN | DIASTOLIC BLOOD PRESSURE: 72 MMHG | BODY MASS INDEX: 26.66 KG/M2 | HEART RATE: 66 BPM | OXYGEN SATURATION: 98 % | WEIGHT: 141.2 LBS

## 2025-08-22 DIAGNOSIS — D64.9 ANEMIA, UNSPECIFIED TYPE: ICD-10-CM

## 2025-08-22 DIAGNOSIS — I10 PRIMARY HYPERTENSION: Primary | ICD-10-CM

## 2025-08-22 DIAGNOSIS — M81.0 OSTEOPOROSIS WITHOUT CURRENT PATHOLOGICAL FRACTURE, UNSPECIFIED OSTEOPOROSIS TYPE: ICD-10-CM

## 2025-08-22 DIAGNOSIS — F33.0 MILD EPISODE OF RECURRENT MAJOR DEPRESSIVE DISORDER: ICD-10-CM

## 2025-08-22 DIAGNOSIS — F41.1 GENERALIZED ANXIETY DISORDER: ICD-10-CM

## 2025-08-22 DIAGNOSIS — E10.65 TYPE 1 DIABETES MELLITUS WITH HYPERGLYCEMIA: ICD-10-CM

## 2025-08-22 DIAGNOSIS — Z87.891 FORMER SMOKER: ICD-10-CM

## 2025-08-22 DIAGNOSIS — E78.00 HYPERCHOLESTEROLEMIA: ICD-10-CM

## 2025-08-22 RX ORDER — ESCITALOPRAM OXALATE 10 MG/1
10 TABLET ORAL DAILY
Qty: 30 TABLET | Refills: 1 | Status: SHIPPED | OUTPATIENT
Start: 2025-08-22

## 2025-08-22 RX ORDER — LISINOPRIL 40 MG/1
40 TABLET ORAL DAILY
Qty: 90 TABLET | Refills: 1 | Status: SHIPPED | OUTPATIENT
Start: 2025-08-22

## 2025-08-22 RX ORDER — ALENDRONATE SODIUM 70 MG/1
TABLET ORAL
Qty: 12 TABLET | Refills: 1 | Status: SHIPPED | OUTPATIENT
Start: 2025-08-22

## 2025-08-22 RX ORDER — HYDROCHLOROTHIAZIDE 25 MG/1
25 TABLET ORAL DAILY
Qty: 90 TABLET | Refills: 1 | Status: SHIPPED | OUTPATIENT
Start: 2025-08-22

## 2025-08-26 LAB
25(OH)D3+25(OH)D2 SERPL-MCNC: 45.1 NG/ML (ref 30–100)
ALBUMIN SERPL-MCNC: 4.3 G/DL (ref 3.5–5.2)
ALBUMIN/GLOB SERPL: 1.9 G/DL
ALP SERPL-CCNC: 70 U/L (ref 39–117)
ALT SERPL-CCNC: 11 U/L (ref 1–33)
AST SERPL-CCNC: 23 U/L (ref 1–32)
BASOPHILS # BLD AUTO: 0.02 10*3/MM3 (ref 0–0.2)
BASOPHILS NFR BLD AUTO: 0.4 % (ref 0–1.5)
BILIRUB SERPL-MCNC: 0.6 MG/DL (ref 0–1.2)
BUN SERPL-MCNC: 20 MG/DL (ref 8–23)
BUN/CREAT SERPL: 18.3 (ref 7–25)
CALCIUM SERPL-MCNC: 9.8 MG/DL (ref 8.6–10.5)
CHLORIDE SERPL-SCNC: 96 MMOL/L (ref 98–107)
CHOLEST SERPL-MCNC: 190 MG/DL (ref 0–200)
CO2 SERPL-SCNC: 21.8 MMOL/L (ref 22–29)
CREAT SERPL-MCNC: 1.09 MG/DL (ref 0.57–1)
EGFRCR SERPLBLD CKD-EPI 2021: 55.1 ML/MIN/1.73
EOSINOPHIL # BLD AUTO: 0.07 10*3/MM3 (ref 0–0.4)
EOSINOPHIL NFR BLD AUTO: 1.3 % (ref 0.3–6.2)
ERYTHROCYTE [DISTWIDTH] IN BLOOD BY AUTOMATED COUNT: 12.1 % (ref 12.3–15.4)
FERRITIN SERPL-MCNC: 193 NG/ML (ref 13–150)
FOLATE SERPL-MCNC: 17.8 NG/ML (ref 4.78–24.2)
GLOBULIN SER CALC-MCNC: 2.3 GM/DL
GLUCOSE SERPL-MCNC: 248 MG/DL (ref 65–99)
HCT VFR BLD AUTO: 35.9 % (ref 34–46.6)
HDLC SERPL-MCNC: 100 MG/DL (ref 40–60)
HGB BLD-MCNC: 12.8 G/DL (ref 12–15.9)
IMM GRANULOCYTES # BLD AUTO: 0.01 10*3/MM3 (ref 0–0.05)
IMM GRANULOCYTES NFR BLD AUTO: 0.2 % (ref 0–0.5)
IRON SERPL-MCNC: 135 MCG/DL (ref 37–145)
LDLC SERPL CALC-MCNC: 75 MG/DL (ref 0–100)
LDLC/HDLC SERPL: 0.73 {RATIO}
LYMPHOCYTES # BLD AUTO: 1.72 10*3/MM3 (ref 0.7–3.1)
LYMPHOCYTES NFR BLD AUTO: 32.1 % (ref 19.6–45.3)
MCH RBC QN AUTO: 34.6 PG (ref 26.6–33)
MCHC RBC AUTO-ENTMCNC: 35.7 G/DL (ref 31.5–35.7)
MCV RBC AUTO: 97 FL (ref 79–97)
MONOCYTES # BLD AUTO: 0.46 10*3/MM3 (ref 0.1–0.9)
MONOCYTES NFR BLD AUTO: 8.6 % (ref 5–12)
NEUTROPHILS # BLD AUTO: 3.08 10*3/MM3 (ref 1.7–7)
NEUTROPHILS NFR BLD AUTO: 57.4 % (ref 42.7–76)
NRBC BLD AUTO-RTO: 0 /100 WBC (ref 0–0.2)
PLATELET # BLD AUTO: 332 10*3/MM3 (ref 140–450)
POTASSIUM SERPL-SCNC: 5.1 MMOL/L (ref 3.5–5.2)
PROT SERPL-MCNC: 6.6 G/DL (ref 6–8.5)
RBC # BLD AUTO: 3.7 10*6/MM3 (ref 3.77–5.28)
SODIUM SERPL-SCNC: 135 MMOL/L (ref 136–145)
TRIGL SERPL-MCNC: 83 MG/DL (ref 0–150)
VIT B12 SERPL-MCNC: 1449 PG/ML (ref 211–946)
VLDLC SERPL CALC-MCNC: 15 MG/DL (ref 5–40)
WBC # BLD AUTO: 5.36 10*3/MM3 (ref 3.4–10.8)

## 2025-08-29 ENCOUNTER — HOSPITAL ENCOUNTER (OUTPATIENT)
Facility: HOSPITAL | Age: 70
Discharge: HOME | End: 2025-08-29
Payer: MEDICARE

## 2025-08-29 DIAGNOSIS — M81.0 OSTEOPOROSIS WITHOUT CURRENT PATHOLOGICAL FRACTURE, UNSPECIFIED OSTEOPOROSIS TYPE: ICD-10-CM

## 2025-08-29 PROCEDURE — 77080 DXA BONE DENSITY AXIAL: CPT

## (undated) DEVICE — PATIENT RETURN ELECTRODE, SINGLE-USE, CONTACT QUALITY MONITORING, ADULT, WITH 9FT CORD, FOR PATIENTS WEIGING OVER 33LBS. (15KG): Brand: MEGADYNE

## (undated) DEVICE — GLV SURG BIOGEL LTX PF 6 1/2

## (undated) DEVICE — EXOFIN PRECISION PEN HIGH VISCOSITY TOPICAL SKIN ADHESIVE: Brand: EXOFIN PRECISION PEN, 1G

## (undated) DEVICE — ANTIBACTERIAL UNDYED BRAIDED (POLYGLACTIN 910), SYNTHETIC ABSORBABLE SUTURE: Brand: COATED VICRYL

## (undated) DEVICE — SUT SILK 2/0 FS BLK 18IN 685G

## (undated) DEVICE — PK PROC MINOR TOWER 40

## (undated) DEVICE — APPL CHLORAPREP HI/LITE 26ML ORNG

## (undated) DEVICE — TRAP FLD MINIVAC MEGADYNE 100ML

## (undated) DEVICE — SUT MNCRYL PLS ANTIB UD 4/0 PS2 18IN

## (undated) DEVICE — PENCL SMOKE/EVAC MEGADYNE TELESCP 10FT

## (undated) DEVICE — ELECTRD BLD EZ CLN MOD 2.5IN

## (undated) DEVICE — SPONGE,LAP,18"X18",DLX,XR,ST,5/PK,40/PK: Brand: MEDLINE